# Patient Record
Sex: FEMALE | Race: WHITE | NOT HISPANIC OR LATINO | Employment: OTHER | URBAN - METROPOLITAN AREA
[De-identification: names, ages, dates, MRNs, and addresses within clinical notes are randomized per-mention and may not be internally consistent; named-entity substitution may affect disease eponyms.]

---

## 2017-12-11 ENCOUNTER — ANESTHESIA EVENT (OUTPATIENT)
Dept: SURGERY | Facility: HOSPITAL | Age: 62
DRG: 481 | End: 2017-12-11
Payer: MEDICARE

## 2017-12-11 ENCOUNTER — HOSPITAL ENCOUNTER (INPATIENT)
Facility: HOSPITAL | Age: 62
LOS: 5 days | Discharge: HOSPICE | DRG: 481 | End: 2017-12-16
Attending: EMERGENCY MEDICINE | Admitting: INTERNAL MEDICINE
Payer: MEDICARE

## 2017-12-11 DIAGNOSIS — S79.919A HIP INJURY: ICD-10-CM

## 2017-12-11 DIAGNOSIS — S72.001A CLOSED FRACTURE OF RIGHT HIP, INITIAL ENCOUNTER: Primary | ICD-10-CM

## 2017-12-11 DIAGNOSIS — S29.9XXA CHEST WALL INJURY: ICD-10-CM

## 2017-12-11 DIAGNOSIS — R00.0 TACHYCARDIA: ICD-10-CM

## 2017-12-11 LAB
ABO + RH BLD: NORMAL
ALBUMIN SERPL BCP-MCNC: 3.3 G/DL
ALP SERPL-CCNC: 49 U/L
ALT SERPL W/O P-5'-P-CCNC: 10 U/L
ANION GAP SERPL CALC-SCNC: 10 MMOL/L
APTT BLDCRRT: 21.7 SEC
AST SERPL-CCNC: 16 U/L
BASOPHILS # BLD AUTO: 0.03 K/UL
BASOPHILS NFR BLD: 0.2 %
BILIRUB SERPL-MCNC: 0.2 MG/DL
BLD GP AB SCN CELLS X3 SERPL QL: NORMAL
BUN SERPL-MCNC: 7 MG/DL
CALCIUM SERPL-MCNC: 8.4 MG/DL
CHLORIDE SERPL-SCNC: 102 MMOL/L
CO2 SERPL-SCNC: 30 MMOL/L
CREAT SERPL-MCNC: 0.6 MG/DL
DIFFERENTIAL METHOD: ABNORMAL
EOSINOPHIL # BLD AUTO: 0 K/UL
EOSINOPHIL NFR BLD: 0.2 %
ERYTHROCYTE [DISTWIDTH] IN BLOOD BY AUTOMATED COUNT: 13.4 %
EST. GFR  (AFRICAN AMERICAN): >60 ML/MIN/1.73 M^2
EST. GFR  (NON AFRICAN AMERICAN): >60 ML/MIN/1.73 M^2
GLUCOSE SERPL-MCNC: 85 MG/DL
HCT VFR BLD AUTO: 31.9 %
HGB BLD-MCNC: 10.6 G/DL
INR PPP: 1
LYMPHOCYTES # BLD AUTO: 1.9 K/UL
LYMPHOCYTES NFR BLD: 12.4 %
MCH RBC QN AUTO: 29.9 PG
MCHC RBC AUTO-ENTMCNC: 33.2 G/DL
MCV RBC AUTO: 90 FL
MONOCYTES # BLD AUTO: 1 K/UL
MONOCYTES NFR BLD: 6.4 %
NEUTROPHILS # BLD AUTO: 12.4 K/UL
NEUTROPHILS NFR BLD: 80.8 %
PLATELET # BLD AUTO: 287 K/UL
PMV BLD AUTO: 10.6 FL
POTASSIUM SERPL-SCNC: 3.4 MMOL/L
PROT SERPL-MCNC: 6 G/DL
PROTHROMBIN TIME: 10.8 SEC
RBC # BLD AUTO: 3.55 M/UL
SODIUM SERPL-SCNC: 142 MMOL/L
WBC # BLD AUTO: 15.36 K/UL

## 2017-12-11 PROCEDURE — 25000003 PHARM REV CODE 250: Performed by: EMERGENCY MEDICINE

## 2017-12-11 PROCEDURE — 99285 EMERGENCY DEPT VISIT HI MDM: CPT | Mod: 25

## 2017-12-11 PROCEDURE — 86901 BLOOD TYPING SEROLOGIC RH(D): CPT

## 2017-12-11 PROCEDURE — 97530 THERAPEUTIC ACTIVITIES: CPT

## 2017-12-11 PROCEDURE — 96375 TX/PRO/DX INJ NEW DRUG ADDON: CPT

## 2017-12-11 PROCEDURE — 63600175 PHARM REV CODE 636 W HCPCS: Performed by: EMERGENCY MEDICINE

## 2017-12-11 PROCEDURE — 80053 COMPREHEN METABOLIC PANEL: CPT

## 2017-12-11 PROCEDURE — 85730 THROMBOPLASTIN TIME PARTIAL: CPT

## 2017-12-11 PROCEDURE — 86900 BLOOD TYPING SEROLOGIC ABO: CPT

## 2017-12-11 PROCEDURE — 11000001 HC ACUTE MED/SURG PRIVATE ROOM

## 2017-12-11 PROCEDURE — 96374 THER/PROPH/DIAG INJ IV PUSH: CPT

## 2017-12-11 PROCEDURE — 96372 THER/PROPH/DIAG INJ SC/IM: CPT | Mod: XU

## 2017-12-11 PROCEDURE — 85610 PROTHROMBIN TIME: CPT

## 2017-12-11 PROCEDURE — 85025 COMPLETE CBC W/AUTO DIFF WBC: CPT

## 2017-12-11 RX ORDER — OXYBUTYNIN CHLORIDE 5 MG/1
5 TABLET ORAL 3 TIMES DAILY
COMMUNITY

## 2017-12-11 RX ORDER — SODIUM CHLORIDE, SODIUM LACTATE, POTASSIUM CHLORIDE, CALCIUM CHLORIDE 600; 310; 30; 20 MG/100ML; MG/100ML; MG/100ML; MG/100ML
INJECTION, SOLUTION INTRAVENOUS CONTINUOUS
Status: DISCONTINUED | OUTPATIENT
Start: 2017-12-11 | End: 2017-12-12

## 2017-12-11 RX ORDER — TRAZODONE HYDROCHLORIDE 50 MG/1
400 TABLET ORAL NIGHTLY
Status: DISCONTINUED | OUTPATIENT
Start: 2017-12-11 | End: 2017-12-16 | Stop reason: HOSPADM

## 2017-12-11 RX ORDER — SODIUM CHLORIDE, SODIUM LACTATE, POTASSIUM CHLORIDE, CALCIUM CHLORIDE 600; 310; 30; 20 MG/100ML; MG/100ML; MG/100ML; MG/100ML
1000 INJECTION, SOLUTION INTRAVENOUS CONTINUOUS
Status: DISCONTINUED | OUTPATIENT
Start: 2017-12-11 | End: 2017-12-11

## 2017-12-11 RX ORDER — MAG HYDROX/ALUMINUM HYD/SIMETH 200-200-20
30 SUSPENSION, ORAL (FINAL DOSE FORM) ORAL
Status: ON HOLD | COMMUNITY
End: 2017-12-16

## 2017-12-11 RX ORDER — MAG HYDROX/ALUMINUM HYD/SIMETH 200-200-20
30 SUSPENSION, ORAL (FINAL DOSE FORM) ORAL
Status: DISCONTINUED | OUTPATIENT
Start: 2017-12-11 | End: 2017-12-16

## 2017-12-11 RX ORDER — MORPHINE SULFATE 8 MG/ML
5 INJECTION INTRAMUSCULAR; INTRAVENOUS; SUBCUTANEOUS
Status: COMPLETED | OUTPATIENT
Start: 2017-12-11 | End: 2017-12-11

## 2017-12-11 RX ORDER — CYCLOBENZAPRINE HCL 10 MG
10 TABLET ORAL 3 TIMES DAILY PRN
COMMUNITY

## 2017-12-11 RX ORDER — FLUOXETINE HYDROCHLORIDE 20 MG/1
20 CAPSULE ORAL DAILY
Status: DISCONTINUED | OUTPATIENT
Start: 2017-12-12 | End: 2017-12-16 | Stop reason: HOSPADM

## 2017-12-11 RX ORDER — MORPHINE SULFATE 8 MG/ML
4 INJECTION INTRAMUSCULAR; INTRAVENOUS; SUBCUTANEOUS EVERY 4 HOURS PRN
Status: DISCONTINUED | OUTPATIENT
Start: 2017-12-12 | End: 2017-12-12

## 2017-12-11 RX ORDER — OMEPRAZOLE 20 MG/1
20 CAPSULE, DELAYED RELEASE ORAL DAILY
COMMUNITY

## 2017-12-11 RX ORDER — PROMETHAZINE HYDROCHLORIDE 25 MG/1
25 TABLET ORAL EVERY 4 HOURS
COMMUNITY

## 2017-12-11 RX ORDER — AMOXICILLIN 250 MG
1 CAPSULE ORAL DAILY
Status: DISCONTINUED | OUTPATIENT
Start: 2017-12-12 | End: 2017-12-16 | Stop reason: HOSPADM

## 2017-12-11 RX ORDER — HEPARIN SODIUM 5000 [USP'U]/ML
5000 INJECTION, SOLUTION INTRAVENOUS; SUBCUTANEOUS
Status: COMPLETED | OUTPATIENT
Start: 2017-12-11 | End: 2017-12-11

## 2017-12-11 RX ORDER — ACETAMINOPHEN 325 MG/1
650 TABLET ORAL EVERY 6 HOURS PRN
Status: DISCONTINUED | OUTPATIENT
Start: 2017-12-11 | End: 2017-12-12

## 2017-12-11 RX ORDER — CYCLOBENZAPRINE HCL 10 MG
10 TABLET ORAL 3 TIMES DAILY PRN
Status: DISCONTINUED | OUTPATIENT
Start: 2017-12-11 | End: 2017-12-12

## 2017-12-11 RX ORDER — OXYCODONE HCL 10 MG/1
30 TABLET, FILM COATED, EXTENDED RELEASE ORAL EVERY 12 HOURS
Status: DISCONTINUED | OUTPATIENT
Start: 2017-12-11 | End: 2017-12-16 | Stop reason: HOSPADM

## 2017-12-11 RX ORDER — POLYETHYLENE GLYCOL 3350 17 G/17G
POWDER, FOR SOLUTION ORAL
COMMUNITY

## 2017-12-11 RX ORDER — LIDOCAINE HYDROCHLORIDE 10 MG/ML
1 INJECTION, SOLUTION EPIDURAL; INFILTRATION; INTRACAUDAL; PERINEURAL ONCE
Status: DISCONTINUED | OUTPATIENT
Start: 2017-12-11 | End: 2017-12-12

## 2017-12-11 RX ORDER — FENTANYL CITRATE 50 UG/ML
50 INJECTION, SOLUTION INTRAMUSCULAR; INTRAVENOUS
Status: COMPLETED | OUTPATIENT
Start: 2017-12-11 | End: 2017-12-11

## 2017-12-11 RX ORDER — OXYBUTYNIN CHLORIDE 5 MG/1
5 TABLET ORAL 3 TIMES DAILY
Status: DISCONTINUED | OUTPATIENT
Start: 2017-12-11 | End: 2017-12-16 | Stop reason: HOSPADM

## 2017-12-11 RX ORDER — TEMAZEPAM 30 MG/1
30 CAPSULE ORAL NIGHTLY PRN
COMMUNITY

## 2017-12-11 RX ORDER — AMLODIPINE BESYLATE 5 MG/1
10 TABLET ORAL DAILY
Status: DISCONTINUED | OUTPATIENT
Start: 2017-12-12 | End: 2017-12-13

## 2017-12-11 RX ORDER — PANTOPRAZOLE SODIUM 40 MG/1
40 TABLET, DELAYED RELEASE ORAL DAILY
Status: DISCONTINUED | OUTPATIENT
Start: 2017-12-12 | End: 2017-12-16 | Stop reason: HOSPADM

## 2017-12-11 RX ORDER — IPRATROPIUM BROMIDE AND ALBUTEROL SULFATE 2.5; .5 MG/3ML; MG/3ML
3 SOLUTION RESPIRATORY (INHALATION) EVERY 6 HOURS PRN
Status: DISCONTINUED | OUTPATIENT
Start: 2017-12-11 | End: 2017-12-16 | Stop reason: HOSPADM

## 2017-12-11 RX ORDER — FLUTICASONE FUROATE AND VILANTEROL 200; 25 UG/1; UG/1
1 POWDER RESPIRATORY (INHALATION) DAILY
Status: DISCONTINUED | OUTPATIENT
Start: 2017-12-11 | End: 2017-12-16 | Stop reason: HOSPADM

## 2017-12-11 RX ORDER — POLYETHYLENE GLYCOL 3350 17 G/17G
17 POWDER, FOR SOLUTION ORAL DAILY
Status: DISCONTINUED | OUTPATIENT
Start: 2017-12-12 | End: 2017-12-16 | Stop reason: HOSPADM

## 2017-12-11 RX ORDER — BENZONATATE 100 MG/1
100 CAPSULE ORAL 3 TIMES DAILY PRN
Status: DISCONTINUED | OUTPATIENT
Start: 2017-12-11 | End: 2017-12-16 | Stop reason: HOSPADM

## 2017-12-11 RX ORDER — ALBUTEROL SULFATE 90 UG/1
2 AEROSOL, METERED RESPIRATORY (INHALATION) EVERY 6 HOURS PRN
Status: DISCONTINUED | OUTPATIENT
Start: 2017-12-11 | End: 2017-12-16 | Stop reason: HOSPADM

## 2017-12-11 RX ORDER — MELOXICAM 15 MG/1
15 TABLET ORAL DAILY
COMMUNITY

## 2017-12-11 RX ORDER — SODIUM CHLORIDE, SODIUM LACTATE, POTASSIUM CHLORIDE, CALCIUM CHLORIDE 600; 310; 30; 20 MG/100ML; MG/100ML; MG/100ML; MG/100ML
1000 INJECTION, SOLUTION INTRAVENOUS CONTINUOUS
Status: ACTIVE | OUTPATIENT
Start: 2017-12-11 | End: 2017-12-12

## 2017-12-11 RX ORDER — AMOXICILLIN 250 MG
1 CAPSULE ORAL 2 TIMES DAILY
Status: DISCONTINUED | OUTPATIENT
Start: 2017-12-11 | End: 2017-12-14

## 2017-12-11 RX ORDER — OXYCODONE HYDROCHLORIDE 30 MG/1
30 TABLET, FILM COATED, EXTENDED RELEASE ORAL EVERY 12 HOURS
COMMUNITY

## 2017-12-11 RX ORDER — GUAIFENESIN 600 MG/1
1200 TABLET, EXTENDED RELEASE ORAL 2 TIMES DAILY
Status: DISCONTINUED | OUTPATIENT
Start: 2017-12-11 | End: 2017-12-16 | Stop reason: HOSPADM

## 2017-12-11 RX ORDER — GUAIFENESIN 600 MG/1
1200 TABLET, EXTENDED RELEASE ORAL 2 TIMES DAILY
COMMUNITY

## 2017-12-11 RX ORDER — AMOXICILLIN 250 MG
1 CAPSULE ORAL DAILY
COMMUNITY

## 2017-12-11 RX ORDER — BENZONATATE 100 MG/1
100 CAPSULE ORAL 3 TIMES DAILY PRN
COMMUNITY

## 2017-12-11 RX ORDER — LORAZEPAM 0.5 MG/1
1 TABLET ORAL
Status: COMPLETED | OUTPATIENT
Start: 2017-12-11 | End: 2017-12-11

## 2017-12-11 RX ORDER — LORAZEPAM 0.5 MG/1
1 TABLET ORAL EVERY 8 HOURS PRN
Status: DISCONTINUED | OUTPATIENT
Start: 2017-12-11 | End: 2017-12-16 | Stop reason: HOSPADM

## 2017-12-11 RX ORDER — FENTANYL 25 UG/1
1 PATCH TRANSDERMAL
COMMUNITY

## 2017-12-11 RX ADMIN — LORAZEPAM 1 MG: 0.5 TABLET ORAL at 12:12

## 2017-12-11 RX ADMIN — CYCLOBENZAPRINE HYDROCHLORIDE 10 MG: 10 TABLET, FILM COATED ORAL at 09:12

## 2017-12-11 RX ADMIN — SODIUM CHLORIDE, SODIUM LACTATE, POTASSIUM CHLORIDE, AND CALCIUM CHLORIDE 1000 ML: .6; .31; .03; .02 INJECTION, SOLUTION INTRAVENOUS at 04:12

## 2017-12-11 RX ADMIN — OXYCODONE HYDROCHLORIDE 30 MG: 10 TABLET, FILM COATED, EXTENDED RELEASE ORAL at 09:12

## 2017-12-11 RX ADMIN — MORPHINE SULFATE 5.04 MG: 8 INJECTION, SOLUTION INTRAMUSCULAR; INTRAVENOUS at 04:12

## 2017-12-11 RX ADMIN — ALUMINUM HYDROXIDE, MAGNESIUM HYDROXIDE, AND SIMETHICONE 30 ML: 200; 200; 20 SUSPENSION ORAL at 04:12

## 2017-12-11 RX ADMIN — ALUMINUM HYDROXIDE, MAGNESIUM HYDROXIDE, AND SIMETHICONE 30 ML: 200; 200; 20 SUSPENSION ORAL at 09:12

## 2017-12-11 RX ADMIN — GUAIFENESIN 1200 MG: 600 TABLET, EXTENDED RELEASE ORAL at 09:12

## 2017-12-11 RX ADMIN — HEPARIN SODIUM 5000 UNITS: 5000 INJECTION, SOLUTION INTRAVENOUS; SUBCUTANEOUS at 02:12

## 2017-12-11 RX ADMIN — DOCUSATE SODIUM AND SENNOSIDES 1 TABLET: 8.6; 5 TABLET, FILM COATED ORAL at 09:12

## 2017-12-11 RX ADMIN — LORAZEPAM 1 MG: 0.5 TABLET ORAL at 04:12

## 2017-12-11 RX ADMIN — TRAZODONE HYDROCHLORIDE 400 MG: 50 TABLET ORAL at 09:12

## 2017-12-11 RX ADMIN — FENTANYL CITRATE 50 MCG: 50 INJECTION, SOLUTION INTRAMUSCULAR; INTRAVENOUS at 11:12

## 2017-12-11 RX ADMIN — ARIPIPRAZOLE 15 MG: 5 TABLET ORAL at 09:12

## 2017-12-11 RX ADMIN — OXYBUTYNIN CHLORIDE 5 MG: 5 TABLET ORAL at 09:12

## 2017-12-11 NOTE — SUBJECTIVE & OBJECTIVE
Past Medical History:   Diagnosis Date    Anxiety reaction     Back pain     neck injury--disc problems    Bipolar disorder     COPD (chronic obstructive pulmonary disease)     DJD (degenerative joint disease)     Essential hypertension 2013    GERD (gastroesophageal reflux disease)     Mild protein malnutrition 6/10/2015    Osteoporosis, unspecified     Tobacco abuse     Weight loss        Past Surgical History:   Procedure Laterality Date     SECTION, CLASSIC      x 1    HYSTERECTOMY         Review of patient's allergies indicates:   Allergen Reactions    Avelox [moxifloxacin] Rash       No current facility-administered medications on file prior to encounter.      Current Outpatient Prescriptions on File Prior to Encounter   Medication Sig    albuterol 90 mcg/actuation inhaler Inhale 2 puffs into the lungs every 6 (six) hours as needed for Wheezing.    albuterol-ipratropium 2.5mg-0.5mg/3mL (DUO-NEB) 0.5 mg-3 mg(2.5 mg base)/3 mL nebulizer solution Take 3 mLs by nebulization every 6 (six) hours as needed for Wheezing.    amlodipine (NORVASC) 10 MG tablet Take 1 tablet (10 mg total) by mouth once daily.    aripiprazole (ABILIFY) 30 MG Tab Take 15 mg by mouth nightly.     fluoxetine (PROZAC) 40 MG capsule Take 20 mg by mouth once daily.     fluticasone-salmeterol 500-50 mcg/dose (ADVAIR DISKUS) 500-50 mcg/dose DsDv diskus inhaler Inhale 1 puff into the lungs 2 (two) times daily. Rinse mouth after each use.    lorazepam (ATIVAN) 1 MG tablet Take 1 mg by mouth 2 (two) times daily as needed.    OXYGEN-AIR DELIVERY SYSTEMS MISC by [facility].(Non-Drug; Combo Route) route. Uses O2 via N/C  At 2 liters at home when neededd    predniSONE (DELTASONE) 20 MG tablet Prednisone 60 mg X 5 days; 50 mg X 1 day; 40 mg X 1 Day; 30 mg X 1 Day; 20 Mg X 1 Day then STOP!!    tiotropium (SPIRIVA WITH HANDIHALER) 18 mcg inhalation capsule Inhale 18 mcg into the lungs once daily.    trazodone (DESYREL) 300 MG  tablet Take 400 mg by mouth every evening.    [DISCONTINUED] aspirin 81 MG Chew Take 81 mg by mouth once daily.    [DISCONTINUED] gabapentin (NEURONTIN) 300 MG capsule Take 300 mg by mouth 3 (three) times daily.    [DISCONTINUED] lamotrigine (LAMICTAL) 200 MG tablet Take 200 mg by mouth once daily.    [DISCONTINUED] NEXIUM 40 mg capsule take 1 capsule by mouth once daily    [DISCONTINUED] oxycodone-acetaminophen (PERCOCET) 7.5-325 mg per tablet Take 1 tablet by mouth every 4 (four) hours as needed for Pain.     Family History     Problem Relation (Age of Onset)    Asthma Mother, Sister    Cancer Father    Heart attack Father    Hypertension Mother, Father    Stroke Mother, Father        Social History Main Topics    Smoking status: Current Every Day Smoker     Packs/day: 0.75     Years: 40.00     Types: Cigarettes    Smokeless tobacco: Never Used    Alcohol use Yes      Comment: OCCASIONALLY    Drug use: No    Sexual activity: Not Currently     Review of Systems   Constitutional: Negative for activity change, appetite change, chills, diaphoresis and fever.   Eyes: Negative for discharge.   Respiratory: Negative for apnea, cough, choking, chest tightness, shortness of breath, wheezing and stridor.    Cardiovascular: Negative for chest pain, palpitations and leg swelling.   Gastrointestinal: Negative for abdominal pain, constipation, diarrhea, nausea and vomiting.   Genitourinary: Negative for difficulty urinating.   Musculoskeletal: Positive for arthralgias.   Psychiatric/Behavioral: Negative for agitation and behavioral problems.     Objective:     Vital Signs (Most Recent):  Temp: 99.9 °F (37.7 °C) (12/11/17 1243)  Pulse: 74 (12/11/17 1243)  Resp: 20 (12/11/17 1243)  BP: 101/66 (12/11/17 1243)  SpO2: 100 % (12/11/17 1243) Vital Signs (24h Range):  Temp:  [98.4 °F (36.9 °C)-99.9 °F (37.7 °C)] 99.9 °F (37.7 °C)  Pulse:  [74-84] 74  Resp:  [20] 20  SpO2:  [100 %] 100 %  BP: (101-126)/(66-72) 101/66      Weight: 44.9 kg (98 lb 14.4 oz)  Body mass index is 19.32 kg/m².    Physical Exam   Constitutional: She appears well-developed and well-nourished.   HENT:   Head: Normocephalic and atraumatic.   Cardiovascular: Normal rate.  Exam reveals no friction rub.    Pulmonary/Chest: Effort normal and breath sounds normal. No respiratory distress. She has no wheezes. She has no rales. She exhibits no tenderness.   Abdominal: Soft. Bowel sounds are normal. There is no tenderness. There is no rebound and no guarding.   Neurological: She is alert.   Skin: Skin is warm and dry.   Psychiatric: She has a normal mood and affect. Her behavior is normal.   Vitals reviewed.          Significant Labs:   BMP:   Recent Labs  Lab 12/11/17  1128   GLU 85      K 3.4*      CO2 30*   BUN 7*   CREATININE 0.6   CALCIUM 8.4*     CBC:   Recent Labs  Lab 12/11/17  1128   WBC 15.36*   HGB 10.6*   HCT 31.9*          Significant Imaging CXR- negative.   X-ray R hip- fracture.   X-ray R ribs- no fracture.

## 2017-12-11 NOTE — HPI
Mrs. Owen is a 61 yo F who presents to the ED with a CC or R hip pain. The patient notes that her daughter was having a seizure- she tried catching her and landed on her R hip. She presents to the ED and is found to have a R hip fracture. The patient has a known history of COPD and is on home 02.  She does not have any other complaints. No worsening SOB. No CP.  Ortho has been consulted and may take the patient to surgery later.

## 2017-12-11 NOTE — H&P
[facility] Medicine  History & Physical    Patient Name: Rebecca Owen  MRN: [id number]  Admission Date: 2017  Attending Physician: Ham Matias Jr., *   Primary Care Provider: Asa Weinstein MD         Patient information was obtained from patient and ER records.     Subjective:     Principal Problem:Closed fracture of right hip    Chief Complaint:   Chief Complaint   Patient presents with    Fall     Pt's daughter accidently ran into pt, pt fell down, + LOC few seconds, Now AAOx4. Pt was on hospice until transfer today. 50 mg of Fentanyl given by EMS. Pt wears Fentanyl patch.          HPI: Mrs. Owen is a 61 yo F who presents to the ED with a CC or R hip pain. The patient notes that her daughter was having a seizure- she tried catching her and landed on her R hip. She presents to the ED and is found to have a R hip fracture. The patient has a known history of COPD and is on home 02.  She does not have any other complaints. No worsening SOB. No CP.  Ortho has been consulted and may take the patient to surgery later.     Past Medical History:   Diagnosis Date    Anxiety reaction     Back pain     neck injury--disc problems    Bipolar disorder     COPD (chronic obstructive pulmonary disease)     DJD (degenerative joint disease)     Essential hypertension 2013    GERD (gastroesophageal reflux disease)     Mild protein malnutrition 6/10/2015    Osteoporosis, unspecified     Tobacco abuse     Weight loss        Past Surgical History:   Procedure Laterality Date     SECTION, CLASSIC      x 1    HYSTERECTOMY         Review of patient's allergies indicates:   Allergen Reactions    Avelox [moxifloxacin] Rash       No current facility-administered medications on file prior to encounter.      Current Outpatient Prescriptions on File Prior to Encounter   Medication Sig    albuterol 90 mcg/actuation inhaler Inhale 2 puffs into the lungs every 6  (six) hours as needed for Wheezing.    albuterol-ipratropium 2.5mg-0.5mg/3mL (DUO-NEB) 0.5 mg-3 mg(2.5 mg base)/3 mL nebulizer solution Take 3 mLs by nebulization every 6 (six) hours as needed for Wheezing.    amlodipine (NORVASC) 10 MG tablet Take 1 tablet (10 mg total) by mouth once daily.    aripiprazole (ABILIFY) 30 MG Tab Take 15 mg by mouth nightly.     fluoxetine (PROZAC) 40 MG capsule Take 20 mg by mouth once daily.     fluticasone-salmeterol 500-50 mcg/dose (ADVAIR DISKUS) 500-50 mcg/dose DsDv diskus inhaler Inhale 1 puff into the lungs 2 (two) times daily. Rinse mouth after each use.    lorazepam (ATIVAN) 1 MG tablet Take 1 mg by mouth 2 (two) times daily as needed.    OXYGEN-AIR DELIVERY SYSTEMS MISC by [facility].(Non-Drug; Combo Route) route. Uses O2 via N/C  At 2 liters at home when neededd    predniSONE (DELTASONE) 20 MG tablet Prednisone 60 mg X 5 days; 50 mg X 1 day; 40 mg X 1 Day; 30 mg X 1 Day; 20 Mg X 1 Day then STOP!!    tiotropium (SPIRIVA WITH HANDIHALER) 18 mcg inhalation capsule Inhale 18 mcg into the lungs once daily.    trazodone (DESYREL) 300 MG tablet Take 400 mg by mouth every evening.    [DISCONTINUED] aspirin 81 MG Chew Take 81 mg by mouth once daily.    [DISCONTINUED] gabapentin (NEURONTIN) 300 MG capsule Take 300 mg by mouth 3 (three) times daily.    [DISCONTINUED] lamotrigine (LAMICTAL) 200 MG tablet Take 200 mg by mouth once daily.    [DISCONTINUED] NEXIUM 40 mg capsule take 1 capsule by mouth once daily    [DISCONTINUED] oxycodone-acetaminophen (PERCOCET) 7.5-325 mg per tablet Take 1 tablet by mouth every 4 (four) hours as needed for Pain.     Family History     Problem Relation (Age of Onset)    Asthma Mother, Sister    Cancer Father    Heart attack Father    Hypertension Mother, Father    Stroke Mother, Father        Social History Main Topics    Smoking status: Current Every Day Smoker     Packs/day: 0.75     Years: 40.00     Types: Cigarettes    Smokeless tobacco:  Never Used    Alcohol use Yes      Comment: OCCASIONALLY    Drug use: No    Sexual activity: Not Currently     Review of Systems   Constitutional: Negative for activity change, appetite change, chills, diaphoresis and fever.   Eyes: Negative for discharge.   Respiratory: Negative for apnea, cough, choking, chest tightness, shortness of breath, wheezing and stridor.    Cardiovascular: Negative for chest pain, palpitations and leg swelling.   Gastrointestinal: Negative for abdominal pain, constipation, diarrhea, nausea and vomiting.   Genitourinary: Negative for difficulty urinating.   Musculoskeletal: Positive for arthralgias.   Psychiatric/Behavioral: Negative for agitation and behavioral problems.     Objective:     Vital Signs (Most Recent):  Temp: 99.9 °F (37.7 °C) (12/11/17 1243)  Pulse: 74 (12/11/17 1243)  Resp: 20 (12/11/17 1243)  BP: 101/66 (12/11/17 1243)  SpO2: 100 % (12/11/17 1243) Vital Signs (24h Range):  Temp:  [98.4 °F (36.9 °C)-99.9 °F (37.7 °C)] 99.9 °F (37.7 °C)  Pulse:  [74-84] 74  Resp:  [20] 20  SpO2:  [100 %] 100 %  BP: (101-126)/(66-72) 101/66     Weight: 44.9 kg (98 lb 14.4 oz)  Body mass index is 19.32 kg/m².    Physical Exam   Constitutional: She appears well-developed and well-nourished.   HENT:   Head: Normocephalic and atraumatic.   Cardiovascular: Normal rate.  Exam reveals no friction rub.    Pulmonary/Chest: Effort normal and breath sounds normal. No respiratory distress. She has no wheezes. She has no rales. She exhibits no tenderness.   Abdominal: Soft. Bowel sounds are normal. There is no tenderness. There is no rebound and no guarding.   Neurological: She is alert.   Skin: Skin is warm and dry.   Psychiatric: She has a normal mood and affect. Her behavior is normal.   Vitals reviewed.          Significant Labs:   BMP:   Recent Labs  Lab 12/11/17  1128   GLU 85      K 3.4*      CO2 30*   BUN 7*   CREATININE 0.6   CALCIUM 8.4*     CBC:   Recent Labs  Lab 12/11/17  1128    WBC 15.36*   HGB 10.6*   HCT 31.9*          Significant Imaging CXR- negative.   X-ray R hip- fracture.   X-ray R ribs- no fracture.     Assessment/Plan:     * Closed fracture of right hip    Ortho consulted.  Ok to proceed with surgery. Increase risk secondary to COPD.  No acute cardiac or pulmonary decompensation .          Chronic respiratory failure with hypoxia    On Home 02. Still smokes.           DJD (degenerative joint disease)    No acute issues.           Anxiety    Resume home meds.           Borderline hypertension    Well controlled on BP meds.           Hypokalemia    Minimal. Will follow           Mild protein malnutrition    Will discuss with patient .          Bipolar 1 disorder    Resume home meds.          Tobacco abuse    Continues to smoke. Discussed with patient           GERD (gastroesophageal reflux disease)    Resume home meds.             VTE Risk Mitigation         Ordered     heparin (porcine) injection 5,000 Units  ED 1 Time     Route:  Subcutaneous        12/11/17 1259        Ok to proceed with surgery. Increase risk secondary to COPD.  No acute cardiac or pulmonary decompensation .        Coliln Jain MD  Department of Hospital Medicine   [facility]

## 2017-12-11 NOTE — ED TRIAGE NOTES
Pt comes from her home via ems. States her daughter was having a seizure and she went to catch her, they both fell back into a marble table. Pt hit the back of her head, no lac. She woke up on the ground. Pt states she has pain to the right flank and right hip. Laying in position of comfort on the left side, no shortening noted. No numbness to right foot.

## 2017-12-11 NOTE — NURSING
Pt transferred to floor via stretcher. Pt in no distress. Complaints of pain to R hip. Oriented to floor and room. Will cont to monitor.

## 2017-12-11 NOTE — PROGRESS NOTES
Physical Therapy   [facility]     Rebecca Owen   MRN: [id number]       [id number]    Nurse Maria A called PT office to inform [facility] traction order for 10lbs to right hip due to hip fracture. Patient with 7/10 pain to right hip and was pre-medicated prior to traction placement. PT wrapped right LE in cast padding per protocol. Traction boot placed to R LE and 10lb weight applied. Patient verbalized minimal relief in pain. Nurse Saha present throughout and educated in safety while traction in use. PT to follow up with patient as needed.     Nissa Gregory, PT

## 2017-12-11 NOTE — ED PROVIDER NOTES
Encounter Date: 2017    SCRIBE #1 NOTE: I, Ileana Lopez, am scribing for, and in the presence of,  Ham Matias MD. I have scribed the following portions of the note - Other sections scribed: ROS and HPI.       History     Chief Complaint   Patient presents with    Fall     Pt's daughter accidently ran into pt, pt fell down, + LOC few seconds, Now AAOx4. Pt was on hospice until transfer today. 50 mg of Fentanyl given by EMS. Pt wears Fentanyl patch.       CC: Fall  HPI: This 62 y.o. female with  a past medical history of Anxiety reaction; Back pain; Bipolar disorder; COPD; DJD; Essential hypertension; GERD; Osteoporosis, unspecified; Tobacco abuse, presents to the ED via EMS complaining of a fall that occurred today just PTA. Patient states her daughter fell on her having seizure and pt fell on a marble table. She reports hitting her head and LOC for few seconds. Patient is AAOx4. She notes she fell on her right chest and right hip. She is c/o severe and constant R lateral hip pain. She is unable to straight her R leg. She reports mild SOB. She denies CP, numbness/weakness, dizziness or any other associated sx. 50 MG Fentanyl patch was given by EMS.       The history is provided by the patient. No  was used.     Review of patient's allergies indicates:   Allergen Reactions    Avelox [moxifloxacin] Rash     Past Medical History:   Diagnosis Date    Anxiety reaction     Back pain     neck injury--disc problems    Bipolar disorder     COPD (chronic obstructive pulmonary disease)     DJD (degenerative joint disease)     Essential hypertension 2013    GERD (gastroesophageal reflux disease)     Mild protein malnutrition 6/10/2015    Osteoporosis, unspecified     Tobacco abuse     Weight loss      Past Surgical History:   Procedure Laterality Date     SECTION, CLASSIC      x 1    HYSTERECTOMY       Family History   Problem Relation Age of Onset    Hypertension  Mother     Stroke Mother     Asthma Mother     Hypertension Father     Stroke Father     Heart attack Father     Cancer Father      lung    Asthma Sister      Social History   Substance Use Topics    Smoking status: Current Every Day Smoker     Packs/day: 0.75     Years: 40.00     Types: Cigarettes    Smokeless tobacco: Never Used    Alcohol use Yes      Comment: OCCASIONALLY     Review of Systems   Constitutional: Negative for fever.   HENT: Negative for sore throat.    Respiratory: Negative for shortness of breath.    Cardiovascular: Positive for chest pain (right chest wall).   Gastrointestinal: Negative for nausea.   Genitourinary: Negative for dysuria.   Musculoskeletal: Positive for arthralgias (R lateral hip pain). Negative for back pain.   Skin: Negative for rash.   Neurological: Positive for syncope. Negative for weakness.   Hematological: Does not bruise/bleed easily.       Physical Exam     Initial Vitals [12/11/17 1102]   BP Pulse Resp Temp SpO2   126/72 84 20 98.4 °F (36.9 °C) 100 %      MAP       90         Physical Exam    Nursing note and vitals reviewed.  Constitutional: She is not diaphoretic. No distress.   Patient is chronically ill-appearing.  She is cachectic.   HENT:   Head: Normocephalic and atraumatic.   Right Ear: External ear normal.   Left Ear: External ear normal.   Nose: Nose normal.   Mouth/Throat: Oropharynx is clear and moist.   Eyes: Conjunctivae and EOM are normal. Pupils are equal, round, and reactive to light. Right eye exhibits no discharge. Left eye exhibits no discharge. No scleral icterus.   Neck: Normal range of motion. Neck supple.   Cardiovascular: Normal rate, regular rhythm, normal heart sounds and intact distal pulses.   No murmur heard.  Pulmonary/Chest: No respiratory distress. She has no wheezes. She has no rhonchi. She has no rales. She exhibits tenderness.   Patient has decreased breath sounds diffusely bilaterally.  Tenderness without crepitus,  deformity, flail chest over the right mid lateral hemithorax.   Abdominal: Soft. Bowel sounds are normal. She exhibits no distension and no mass. There is no tenderness. There is no rebound and no guarding.   Musculoskeletal: She exhibits tenderness. She exhibits no edema.   Significant tenderness with deformity noted over the left hip.  Skin appears normal.  DP and PT pulses are equal and intact in bilateral lower extremities.  Sensation and motor equal and intact in bilateral lower extremities.   Neurological: She is alert and oriented to person, place, and time. She has normal strength. No cranial nerve deficit or sensory deficit.   Skin: Skin is warm and dry. No rash noted. No erythema. No pallor.   Psychiatric: She has a normal mood and affect. Her behavior is normal. Judgment and thought content normal.         ED Course   Procedures  Labs Reviewed   CBC W/ AUTO DIFFERENTIAL - Abnormal; Notable for the following:        Result Value    WBC 15.36 (*)     RBC 3.55 (*)     Hemoglobin 10.6 (*)     Hematocrit 31.9 (*)     Gran # 12.4 (*)     Gran% 80.8 (*)     Lymph% 12.4 (*)     All other components within normal limits   COMPREHENSIVE METABOLIC PANEL - Abnormal; Notable for the following:     Potassium 3.4 (*)     CO2 30 (*)     BUN, Bld 7 (*)     Calcium 8.4 (*)     Albumin 3.3 (*)     Alkaline Phosphatase 49 (*)     All other components within normal limits   APTT   PROTIME-INR   TYPE & SCREEN          X-Rays:   Independently Interpreted Readings:   Other Readings:  Rib x-ray reveals no evidence of rib fracture.  Chest x-ray reviewed and interpreted by me shows no evidence of pulmonary edema, pneumothorax, or consolidations/ infiltrates.    Hip x-ray reveals acute right intratrochanteric hip fracture.    Chest x-ray reviewed and interpreted by me shows no evidence of pulmonary edema, pneumothorax, or consolidations/ infiltrates.    Medical Decision Making:   ED Management:  This is the emergent evaluation of a  62-year-old female presents the emergency department complaining of fall with associated right-sided chest wall pain, right hip pain, and possible head injury.  Patient is unsure whether she lost consciousness.Differential diagnosis at the time of initial evaluation included, but was not limited to: Intracranial hemorrhage, skull fracture, concussion, scalp contusion, rib fracture, rib contusion, pneumothorax, pulmonary contusion, hip fracture, hip contusion, hip dislocation.  Patient has an intratrochanteric right hip fracture.  Case was discussed with orthopedics, Dr. Art.  He advises [facility] and admission.  Patient will be admitted to internal medicine given chronic medical problems.  I discussed the case with the admitting hospitalist.            Scribe Attestation:   Scribe #1: I performed the above scribed service and the documentation accurately describes the services I performed. I attest to the accuracy of the note.    Attending Attestation:           Physician Attestation for Scribe:  Physician Attestation Statement for Scribe #1: I, Ham Matias MD, reviewed documentation, as scribed by Ileana Lopez in my presence, and it is both accurate and complete.                 ED Course      Clinical Impression:   The primary encounter diagnosis was Closed fracture of right hip, initial encounter. Diagnoses of Hip injury and Chest wall injury were also pertinent to this visit.                           Ham Matias Jr., MD  12/11/17 8468

## 2017-12-11 NOTE — HOSPITAL COURSE
Ms Owen presented with traumatic R hip fracture. Imaging revealed acute comminuted intratrochanteric fracture of right hip. Ortho consulted. Patient is DNR and currently under hospice care, however, patient is not actively dying and intervention for fracture would offer benefit, comfort and improved quality of life. Underwent IM nailing of right hip on 12/12. No complications or significant blood loss. Remained stable respiratory wise post surgery. Tolerated diet, DVT prophylaxis and physical therapy but with minimal activity as per patient's request and tolerance. Had one episode of hyperactive delirium overnight 12/13-12/14. This eventually resolved and was placed on CPAP for more oxygen support in the meantime. Also with blood loss post op, which is expected, especially with underlying anemia of chronic disease. One unit of blood transfused. Patient overall improved with this. After discussion with patient, patient's daughter and physical therapy, aggressive rehab services in either rehab facility or SNF were not pursued. Patient's underlying COPD is a limiting factor for aggressive therapy. Patient's preference is to proceed with minimal activity as tolerated. Conservative aspirin for DVT prophylaxis. F/u with ortho in 2 weeks. Regular diet.

## 2017-12-11 NOTE — ASSESSMENT & PLAN NOTE
Ortho consulted.  Ok to proceed with surgery. Increase risk secondary to COPD.  No acute cardiac or pulmonary decompensation .

## 2017-12-12 ENCOUNTER — ANESTHESIA (OUTPATIENT)
Dept: SURGERY | Facility: HOSPITAL | Age: 62
DRG: 481 | End: 2017-12-12
Payer: MEDICARE

## 2017-12-12 PROCEDURE — 27201423 OPTIME MED/SURG SUP & DEVICES STERILE SUPPLY: Performed by: ORTHOPAEDIC SURGERY

## 2017-12-12 PROCEDURE — 25000003 PHARM REV CODE 250: Performed by: NURSE ANESTHETIST, CERTIFIED REGISTERED

## 2017-12-12 PROCEDURE — 63600175 PHARM REV CODE 636 W HCPCS: Performed by: NURSE ANESTHETIST, CERTIFIED REGISTERED

## 2017-12-12 PROCEDURE — 94640 AIRWAY INHALATION TREATMENT: CPT

## 2017-12-12 PROCEDURE — S0020 INJECTION, BUPIVICAINE HYDRO: HCPCS | Performed by: ANESTHESIOLOGY

## 2017-12-12 PROCEDURE — 94761 N-INVAS EAR/PLS OXIMETRY MLT: CPT

## 2017-12-12 PROCEDURE — D9220A PRA ANESTHESIA: Mod: GW,ANES,, | Performed by: ANESTHESIOLOGY

## 2017-12-12 PROCEDURE — 25000003 PHARM REV CODE 250: Performed by: EMERGENCY MEDICINE

## 2017-12-12 PROCEDURE — 37000008 HC ANESTHESIA 1ST 15 MINUTES: Performed by: ORTHOPAEDIC SURGERY

## 2017-12-12 PROCEDURE — 27200688 HC TRAY, SPINAL-HYPER/ ISOBARIC: Performed by: NURSE ANESTHETIST, CERTIFIED REGISTERED

## 2017-12-12 PROCEDURE — 37000009 HC ANESTHESIA EA ADD 15 MINS: Performed by: ORTHOPAEDIC SURGERY

## 2017-12-12 PROCEDURE — 63600175 PHARM REV CODE 636 W HCPCS: Performed by: HOSPITALIST

## 2017-12-12 PROCEDURE — 63600175 PHARM REV CODE 636 W HCPCS: Performed by: ORTHOPAEDIC SURGERY

## 2017-12-12 PROCEDURE — 36000710: Performed by: ORTHOPAEDIC SURGERY

## 2017-12-12 PROCEDURE — 25000003 PHARM REV CODE 250: Performed by: ANESTHESIOLOGY

## 2017-12-12 PROCEDURE — 27000221 HC OXYGEN, UP TO 24 HOURS

## 2017-12-12 PROCEDURE — 71000033 HC RECOVERY, INTIAL HOUR: Performed by: ORTHOPAEDIC SURGERY

## 2017-12-12 PROCEDURE — 25000242 PHARM REV CODE 250 ALT 637 W/ HCPCS: Performed by: EMERGENCY MEDICINE

## 2017-12-12 PROCEDURE — 11000001 HC ACUTE MED/SURG PRIVATE ROOM

## 2017-12-12 PROCEDURE — 71000039 HC RECOVERY, EACH ADD'L HOUR: Performed by: ORTHOPAEDIC SURGERY

## 2017-12-12 PROCEDURE — 0QH636Z INSERTION OF INTRAMEDULLARY INTERNAL FIXATION DEVICE INTO RIGHT UPPER FEMUR, PERCUTANEOUS APPROACH: ICD-10-PCS | Performed by: ORTHOPAEDIC SURGERY

## 2017-12-12 PROCEDURE — D9220A PRA ANESTHESIA: Mod: GW,CRNA,, | Performed by: NURSE ANESTHETIST, CERTIFIED REGISTERED

## 2017-12-12 PROCEDURE — 36000711: Performed by: ORTHOPAEDIC SURGERY

## 2017-12-12 PROCEDURE — C1713 ANCHOR/SCREW BN/BN,TIS/BN: HCPCS | Performed by: ORTHOPAEDIC SURGERY

## 2017-12-12 PROCEDURE — 63600175 PHARM REV CODE 636 W HCPCS: Performed by: ANESTHESIOLOGY

## 2017-12-12 PROCEDURE — C1769 GUIDE WIRE: HCPCS | Performed by: ORTHOPAEDIC SURGERY

## 2017-12-12 DEVICE — NAIL IM CANN 130 DEG 11X340 R: Type: IMPLANTABLE DEVICE | Site: HIP | Status: FUNCTIONAL

## 2017-12-12 DEVICE — SCREW LOCKING 40MM: Type: IMPLANTABLE DEVICE | Site: HIP | Status: FUNCTIONAL

## 2017-12-12 RX ORDER — CEFAZOLIN SODIUM 1 G/50ML
1 SOLUTION INTRAVENOUS
Status: COMPLETED | OUTPATIENT
Start: 2017-12-12 | End: 2017-12-13

## 2017-12-12 RX ORDER — DEXAMETHASONE SODIUM PHOSPHATE 4 MG/ML
INJECTION, SOLUTION INTRA-ARTICULAR; INTRALESIONAL; INTRAMUSCULAR; INTRAVENOUS; SOFT TISSUE
Status: DISCONTINUED | OUTPATIENT
Start: 2017-12-12 | End: 2017-12-12

## 2017-12-12 RX ORDER — CEFAZOLIN SODIUM 2 G/50ML
2 SOLUTION INTRAVENOUS
Status: COMPLETED | OUTPATIENT
Start: 2017-12-12 | End: 2017-12-12

## 2017-12-12 RX ORDER — ACETAMINOPHEN 10 MG/ML
1000 INJECTION, SOLUTION INTRAVENOUS EVERY 8 HOURS
Status: COMPLETED | OUTPATIENT
Start: 2017-12-12 | End: 2017-12-13

## 2017-12-12 RX ORDER — MORPHINE SULFATE 8 MG/ML
4 INJECTION INTRAMUSCULAR; INTRAVENOUS; SUBCUTANEOUS EVERY 4 HOURS PRN
Status: DISCONTINUED | OUTPATIENT
Start: 2017-12-12 | End: 2017-12-14

## 2017-12-12 RX ORDER — BUPIVACAINE HYDROCHLORIDE 5 MG/ML
INJECTION, SOLUTION EPIDURAL; INTRACAUDAL
Status: DISCONTINUED | OUTPATIENT
Start: 2017-12-12 | End: 2017-12-12

## 2017-12-12 RX ORDER — PROPOFOL 10 MG/ML
VIAL (ML) INTRAVENOUS
Status: DISCONTINUED | OUTPATIENT
Start: 2017-12-12 | End: 2017-12-12

## 2017-12-12 RX ORDER — PHENYLEPHRINE HYDROCHLORIDE 10 MG/ML
INJECTION INTRAVENOUS
Status: DISCONTINUED | OUTPATIENT
Start: 2017-12-12 | End: 2017-12-12

## 2017-12-12 RX ORDER — ACETAMINOPHEN 10 MG/ML
1000 INJECTION, SOLUTION INTRAVENOUS EVERY 8 HOURS
Status: DISCONTINUED | OUTPATIENT
Start: 2017-12-12 | End: 2017-12-12

## 2017-12-12 RX ORDER — SODIUM CHLORIDE, SODIUM LACTATE, POTASSIUM CHLORIDE, CALCIUM CHLORIDE 600; 310; 30; 20 MG/100ML; MG/100ML; MG/100ML; MG/100ML
INJECTION, SOLUTION INTRAVENOUS CONTINUOUS PRN
Status: DISCONTINUED | OUTPATIENT
Start: 2017-12-12 | End: 2017-12-12

## 2017-12-12 RX ORDER — LIDOCAINE HCL/PF 100 MG/5ML
SYRINGE (ML) INTRAVENOUS
Status: DISCONTINUED | OUTPATIENT
Start: 2017-12-12 | End: 2017-12-12

## 2017-12-12 RX ORDER — OXYCODONE HYDROCHLORIDE 5 MG/1
5 TABLET ORAL EVERY 4 HOURS PRN
Status: DISCONTINUED | OUTPATIENT
Start: 2017-12-12 | End: 2017-12-16 | Stop reason: HOSPADM

## 2017-12-12 RX ORDER — ENOXAPARIN SODIUM 100 MG/ML
30 INJECTION SUBCUTANEOUS EVERY 24 HOURS
Status: DISCONTINUED | OUTPATIENT
Start: 2017-12-13 | End: 2017-12-14 | Stop reason: DRUGHIGH

## 2017-12-12 RX ORDER — MORPHINE SULFATE 8 MG/ML
2 INJECTION INTRAMUSCULAR; INTRAVENOUS; SUBCUTANEOUS EVERY 5 MIN PRN
Status: DISCONTINUED | OUTPATIENT
Start: 2017-12-12 | End: 2017-12-12

## 2017-12-12 RX ORDER — SODIUM CHLORIDE 0.9 % (FLUSH) 0.9 %
3 SYRINGE (ML) INJECTION
Status: DISCONTINUED | OUTPATIENT
Start: 2017-12-12 | End: 2017-12-12

## 2017-12-12 RX ADMIN — CEFAZOLIN SODIUM 1 G: 1 INJECTION, SOLUTION INTRAVENOUS at 10:12

## 2017-12-12 RX ADMIN — BUPIVACAINE HYDROCHLORIDE 2.75 ML: 5 INJECTION, SOLUTION EPIDURAL; INTRACAUDAL; PERINEURAL at 10:12

## 2017-12-12 RX ADMIN — LORAZEPAM 1 MG: 0.5 TABLET ORAL at 07:12

## 2017-12-12 RX ADMIN — ACETAMINOPHEN 1000 MG: 10 INJECTION, SOLUTION INTRAVENOUS at 09:12

## 2017-12-12 RX ADMIN — PHENYLEPHRINE HYDROCHLORIDE 200 MCG: 10 INJECTION INTRAVENOUS at 10:12

## 2017-12-12 RX ADMIN — SODIUM CHLORIDE, SODIUM LACTATE, POTASSIUM CHLORIDE, AND CALCIUM CHLORIDE: .6; .31; .03; .02 INJECTION, SOLUTION INTRAVENOUS at 10:12

## 2017-12-12 RX ADMIN — MORPHINE SULFATE 4 MG: 8 INJECTION, SOLUTION INTRAMUSCULAR; INTRAVENOUS at 08:12

## 2017-12-12 RX ADMIN — OXYCODONE HYDROCHLORIDE 30 MG: 10 TABLET, FILM COATED, EXTENDED RELEASE ORAL at 09:12

## 2017-12-12 RX ADMIN — PROPOFOL 10 MG: 10 INJECTION, EMULSION INTRAVENOUS at 10:12

## 2017-12-12 RX ADMIN — FLUTICASONE FUROATE AND VILANTEROL TRIFENATATE 1 PUFF: 200; 25 POWDER RESPIRATORY (INHALATION) at 08:12

## 2017-12-12 RX ADMIN — OXYCODONE HYDROCHLORIDE 30 MG: 10 TABLET, FILM COATED, EXTENDED RELEASE ORAL at 08:12

## 2017-12-12 RX ADMIN — OXYBUTYNIN CHLORIDE 5 MG: 5 TABLET ORAL at 09:12

## 2017-12-12 RX ADMIN — IPRATROPIUM BROMIDE AND ALBUTEROL SULFATE 3 ML: .5; 3 SOLUTION RESPIRATORY (INHALATION) at 07:12

## 2017-12-12 RX ADMIN — POLYETHYLENE GLYCOL (3350) 17 G: 17 POWDER, FOR SOLUTION ORAL at 08:12

## 2017-12-12 RX ADMIN — OXYBUTYNIN CHLORIDE 5 MG: 5 TABLET ORAL at 05:12

## 2017-12-12 RX ADMIN — MORPHINE SULFATE 4 MG: 8 INJECTION, SOLUTION INTRAMUSCULAR; INTRAVENOUS at 05:12

## 2017-12-12 RX ADMIN — IPRATROPIUM BROMIDE AND ALBUTEROL SULFATE 3 ML: .5; 3 SOLUTION RESPIRATORY (INHALATION) at 06:12

## 2017-12-12 RX ADMIN — PHENYLEPHRINE HYDROCHLORIDE 100 MCG: 10 INJECTION INTRAVENOUS at 10:12

## 2017-12-12 RX ADMIN — GUAIFENESIN 1200 MG: 600 TABLET, EXTENDED RELEASE ORAL at 09:12

## 2017-12-12 RX ADMIN — DOCUSATE SODIUM AND SENNOSIDES 1 TABLET: 8.6; 5 TABLET, FILM COATED ORAL at 08:12

## 2017-12-12 RX ADMIN — TRAZODONE HYDROCHLORIDE 400 MG: 50 TABLET ORAL at 09:12

## 2017-12-12 RX ADMIN — ARIPIPRAZOLE 15 MG: 5 TABLET ORAL at 09:12

## 2017-12-12 RX ADMIN — EPHEDRINE SULFATE 5 MG: 50 INJECTION, SOLUTION INTRAMUSCULAR; INTRAVENOUS; SUBCUTANEOUS at 11:12

## 2017-12-12 RX ADMIN — DEXAMETHASONE SODIUM PHOSPHATE 4 MG: 4 INJECTION, SOLUTION INTRAMUSCULAR; INTRAVENOUS at 11:12

## 2017-12-12 RX ADMIN — GUAIFENESIN 1200 MG: 600 TABLET, EXTENDED RELEASE ORAL at 08:12

## 2017-12-12 RX ADMIN — ALUMINUM HYDROXIDE, MAGNESIUM HYDROXIDE, AND SIMETHICONE 30 ML: 200; 200; 20 SUSPENSION ORAL at 05:12

## 2017-12-12 RX ADMIN — FLUOXETINE 20 MG: 20 CAPSULE ORAL at 08:12

## 2017-12-12 RX ADMIN — CEFAZOLIN SODIUM 1 G: 1 INJECTION, SOLUTION INTRAVENOUS at 05:12

## 2017-12-12 RX ADMIN — CEFAZOLIN SODIUM 2 G: 2 SOLUTION INTRAVENOUS at 10:12

## 2017-12-12 RX ADMIN — PHENYLEPHRINE HYDROCHLORIDE 100 MCG: 10 INJECTION INTRAVENOUS at 11:12

## 2017-12-12 RX ADMIN — SODIUM CHLORIDE, SODIUM LACTATE, POTASSIUM CHLORIDE, AND CALCIUM CHLORIDE: .6; .31; .03; .02 INJECTION, SOLUTION INTRAVENOUS at 09:12

## 2017-12-12 RX ADMIN — ACETAMINOPHEN 1000 MG: 10 INJECTION, SOLUTION INTRAVENOUS at 12:12

## 2017-12-12 RX ADMIN — PROPOFOL 20 MG: 10 INJECTION, EMULSION INTRAVENOUS at 10:12

## 2017-12-12 RX ADMIN — EPHEDRINE SULFATE 25 MG: 50 INJECTION, SOLUTION INTRAMUSCULAR; INTRAVENOUS; SUBCUTANEOUS at 10:12

## 2017-12-12 RX ADMIN — PANTOPRAZOLE SODIUM 40 MG: 40 TABLET, DELAYED RELEASE ORAL at 08:12

## 2017-12-12 RX ADMIN — LIDOCAINE HYDROCHLORIDE 40 MG: 20 INJECTION, SOLUTION INTRAVENOUS at 10:12

## 2017-12-12 RX ADMIN — ALBUTEROL SULFATE 2 PUFF: 90 AEROSOL, METERED RESPIRATORY (INHALATION) at 06:12

## 2017-12-12 RX ADMIN — ALUMINUM HYDROXIDE, MAGNESIUM HYDROXIDE, AND SIMETHICONE 30 ML: 200; 200; 20 SUSPENSION ORAL at 09:12

## 2017-12-12 RX ADMIN — DOCUSATE SODIUM AND SENNOSIDES 1 TABLET: 8.6; 5 TABLET, FILM COATED ORAL at 09:12

## 2017-12-12 NOTE — OP NOTE
DATE OF PROCEDURE:  12/12/2017    SURGEON:  Papito Art III, M.D.    ASSISTANT:  Latonya Amaro LPN.    PREOPERATIVE DIAGNOSIS:  Right hip intertrochanteric fracture, reverse oblique.    POSTOPERATIVE DIAGNOSIS:  Right hip intertrochanteric fracture, reverse oblique.    PROCEDURE:  IM nailing, right hip.    INDICATION:  The patient is a 62-year-old female with a history of fall   suffering a right hip intertrochanteric fracture.  She was evaluated and found   to have a reverse oblique intertrochanteric fracture with displacement.  She was   admitted.  I was consulted for management of the hip.  Options were discussed   with the patient including conservative treatment with traction pain medication   and surgery for IM nailing.  After discussing the risks and benefits, she   elected to go forth with IM nailing to try to maintain her ability to ambulate,   was taken to surgery for IM nailing of her hip after consents were obtained.  No   guarantees were made.    PROCEDURE IN DETAIL:  After proper informed consent was obtained, the patient   brought to the operating room and place supine on the operative table.  A spinal   anesthesia was in place.  She was placed in a traction table.  All pressure   points were well padded.  She was placed in a tracking against perineal post.    Right hip was close reduced under fluoroscopy and then prepped and draped in   sterile fashion.  Incision was carried out proximally and dissection taken down   the greater trochanter, a guidewire was inserted, it was reamed with a Synthes   reamer.  A guidewire was inserted down the canal, it was measured for a 340 mm   TFN, it was reamed to 12.5 and a right 340 TFN was inserted over the guidewire.    A pin was placed in the center position of the femoral head with a proximal   locking guide.  It was then measured and reamed and an 85 mm helical blade was   inserted into position.  It was locked for rotation and sliding given the    unstable reverse oblique nature of the fracture.  A single screw was placed   distally with perfect circles technique.  Guide was removed.  It was visualized   under fluoroscopy, found to be appropriate reduction of fracture and placement   of the hardware.  The wounds were then thoroughly irrigated and closed with 0   Vicryl, 2-0 Vicryl and staples.  She was placed in sterile dressings and brought   to Recovery Room in stable condition.  Estimated blood loss was 100 mL.  No   complications were noted.      KBETY/FELY  dd: 12/12/2017 11:56:05 (CST)  td: 12/12/2017 17:48:03 (CST)  Doc ID   #[id number]  Job ID #[id number]    CC:

## 2017-12-12 NOTE — PROGRESS NOTES
Pt on 6l nasal cannula sats 99%the patient wears 6l at home.Pt has decreased bs bilateraly.Pt tolerated respiratory treatment with SCOOBY.

## 2017-12-12 NOTE — PROGRESS NOTES
SW met with patient to introduce self and discuss home situation.  Patient stated that she lives with her son and grandson.  Patient stated that she was on hospice prior to admit with dx of COPD.  Patient was not able to provide the name of her hospice agency.  SW will f/u with the son.  Patient currently hospitalized with a hip fx.  Ally Colin LMSW, DAVE[facility]DAVID, [facility]  12/12/2017

## 2017-12-12 NOTE — CONSULTS
DATE OF CONSULTATION:  2017    CONSULTING PHYSICIAN:  Papito Art III, M.D.    ADMITTING PHYSICIAN:  Odette Blount M.D.    CHIEF COMPLAINT:  Right hip fracture.    HISTORY OF PRESENT ILLNESS:  The patient is a 62-year-old female with a history   of a fall when her daughter actually ran into her, she fell down and suffered   right hip intertrochanteric fracture.  She was previously at home on hospice   because of her pulmonary status.  She presented to the Emergency Room where she   was diagnosed with a right hip intertrochanteric fracture.  She has a history of   COPD and is on home oxygen.  She denies any worsening shortness of breath or   problems.  She was admitted and I was consulted for management of her hip   fracture.    PAST MEDICAL HISTORY:  Anxiety, back pain, bipolar disorder, COPD, degenerative   disease, hypertension, gastroesophageal reflux disease, malnutrition,   osteoporosis, tobacco abuse and weight loss.    PAST SURGICAL HISTORY:   section and hysterectomy.    ALLERGIES:  AVELOX.    HOME MEDICATIONS:  Albuterol, DuoNeb, amlodipine, Abilify, Prozac, Advair,   Ativan, home O2, Deltasone, Spiriva, Desyrel.    FAMILY HISTORY:  Asthma, cancer, heart attack, hypertension and stroke.    SOCIAL HISTORY:  Reveals she is a smoker of three-quarters of pack a day for 40   years.  She consumes alcohol on occasion.    REVIEW OF SYSTEMS:  Positive for arthritis and weight loss.    PHYSICAL EXAMINATION:  Reveals she is a thin female in bed.  Her arms are   nontender to palpation and range of motion.  Her right lower ribs are tender in   the anterior axillary line.  Her left ribs are nontender.  Her abdomen is soft   and nontender.  Pelvis is stable.  Left leg is nontender.  Right leg is tender   about the hip.  She is nontender about the knee and ankle and foot.  She is able   to flex and extend her toes.    Radiographs of her hip reveal intertrochanteric fracture with reverse obliquity   on  the right hip.    ASSESSMENT AND PLAN:  She has a displaced right hip intertrochanteric fracture,   which is reverse oblique.  I think the hip would be best treated with IM   nailing.  Nonoperative treatment with bed rest and pain medication is another   option; however, it would be difficult to mobilize her for several weeks.  She   would most likely not be able to walk on that side following this type of   fracture to heal without surgery, but it is possible to treat her and get her   comfortable nonoperatively.  She wishes to try to remain ambulatory and to go   forth with surgery.  We will set her up for IM nailing of her hip.  Consents   were signed.  No guarantees were made.      KQR/IN  dd: 12/12/2017 08:45:39 (CST)  td: 12/12/2017 09:18:46 (CST)  Doc ID   #[id number]  Job ID #[id number]    CC:

## 2017-12-12 NOTE — TRANSFER OF CARE
Anesthesia Transfer of Care Note    Patient: Rebecca Owen    Procedure(s) Performed: Procedure(s) (LRB):  IM NAILING OF HIP (Right)    Patient location: PACU    Anesthesia Type: spinal and MAC    Transport from OR: Transported from OR on room air with adequate spontaneous ventilation    Post pain: adequate analgesia    Post assessment: no apparent anesthetic complications and tolerated procedure well    Post vital signs: stable    Level of consciousness: awake, alert and oriented    Nausea/Vomiting: no nausea/vomiting    Complications: none    Transfer of care protocol was followed      Last vitals:   Visit Vitals  BP (!) 95/57 (BP Location: Left arm)   Pulse 82   Temp 36.5 °C (97.7 °F) (Tympanic)   Resp 17   Ht 5' (1.524 m)   Wt 44.9 kg (98 lb 14.4 oz)   LMP  (LMP Unknown)   SpO2 98%   Breastfeeding? No   BMI 19.32 kg/m²

## 2017-12-12 NOTE — OR NURSING
Patient awake, alert, on 8L non rebreather, O2 sats 100%.  Right hip and leg dressing dry and intact.  Positive movement and sensation to lower extremities.  Warm to touch, brisk refill. DP/PT palpable

## 2017-12-12 NOTE — BRIEF OP NOTE
Operative Note         SUMMARY     Surgery Date: 12/12/2017     Surgeon(s) and Role:     * Papito Art III, MD - Primary    Pre-op Diagnosis:  R hip IT fx    Post-op Diagnosis: same    Procedure(s) (LRB):  IM NAILING OF HIP (Right)    Anesthesia: Spinal    Findings/Key Components:      Estimated Blood Loss: 100ml         Specimens     None

## 2017-12-12 NOTE — ANESTHESIA PREPROCEDURE EVALUATION
12/12/2017  Rebecca Owen is a 62 y.o., female.    Anesthesia Evaluation     I have reviewed the Nursing Notes.      Review of Systems  Anesthesia Hx:  No problems with previous Anesthesia   Social:  Smoker    Cardiovascular:   Denies Pacemaker. Hypertension  Denies Valvular problems/Murmurs.  Denies MI.  Denies CAD.    Denies CABG/stent.  Denies Dysrhythmias.   Denies Angina.             denies PVD ECG has been reviewed.    Pulmonary:   Denies Pneumonia COPD (O2 dependent), severe Denies Sleep Apnea.    Renal/:  Renal/ Normal     Hepatic/GI:   No Bowel Prep. Denies PUD. GERD Denies Liver Disease. Denies Hepatitis.    Musculoskeletal:   Arthritis   Spine Disorders: Degenerative disease    Neurological:   Denies CVA.   Chronic Pain Syndrome   Endocrine:  Endocrine Normal    Psych:   Psychiatric History          Physical Exam  General:  Cachexia                 Anesthesia Plan  Type of Anesthesia, risks & benefits discussed:  Anesthesia Type:  spinal, general  Patient's Preference:   Intra-op Monitoring Plan: standard ASA monitors  Intra-op Monitoring Plan Comments:   Post Op Pain Control Plan:   Post Op Pain Control Plan Comments:   Induction:   IV  Beta Blocker:  Patient is not currently on a Beta-Blocker (No further documentation required).       Informed Consent:    ASA Score: 4     Day of Surgery Review of History & Physical:    H&P update referred to the surgeon.     Anesthesia Plan Notes: O2-dependent COPD pt. No contraindication to spinal        Ready For Surgery From Anesthesia Perspective.

## 2017-12-12 NOTE — ANESTHESIA PROCEDURE NOTES
Spinal    Diagnosis: right hip fracture  Patient location during procedure: holding area  Start time: 12/12/2017 10:01 AM  Timeout: 12/12/2017 10:00 AM  End time: 12/12/2017 10:08 AM  Staffing  Anesthesiologist: CHARLENE RAGLAND  Performed: anesthesiologist   Preanesthetic Checklist  Completed: patient identified, site marked, surgical consent, pre-op evaluation, timeout performed, IV checked, risks and benefits discussed and monitors and equipment checked  Spinal Block  Patient position: right lateral decubitus  Prep: ChloraPrep  Patient monitoring: heart rate, cardiac monitor and continuous pulse ox  Approach: midline  Location: L4-5  Injection technique: single shot  CSF Fluid: clear free-flowing CSF  Needle  Needle type: Jose   Needle gauge: 25 G  Needle length: 3.5 in  Additional Documentation: negative aspiration for heme and no paresthesia on injection  Needle localization: anatomical landmarks  Assessment  Ease of block: easy  Patient's tolerance of the procedure: comfortable throughout block and no complaints  Medications:  Bolus administered: 2.8 mL of 0.5 bupivacaine  Epinephrine added: none

## 2017-12-12 NOTE — CONSULTS
Dictation #1  MRN:[id number]  CSN:[id number]    Dict # [id number]    63 y/o F with COPD, R hip IT fx, displaced, reverse oblique    Discussed options with pt non-op vs surgery for IM nailing.  She would like to proceed with surgery to try to remain ambulatory.

## 2017-12-13 LAB
ALLENS TEST: ABNORMAL
DELSYS: ABNORMAL
FLOW: 4
HCO3 UR-SCNC: 30.4 MMOL/L (ref 24–28)
MODE: ABNORMAL
PCO2 BLDA: 35.3 MMHG (ref 35–45)
PH SMN: 7.54 [PH] (ref 7.35–7.45)
PO2 BLDA: 53 MMHG (ref 80–100)
POC BE: 7 MMOL/L
POC SATURATED O2: 91 % (ref 95–100)
POC TCO2: 31 MMOL/L (ref 23–27)
POCT GLUCOSE: 123 MG/DL (ref 70–110)
SAMPLE: ABNORMAL
SITE: ABNORMAL

## 2017-12-13 PROCEDURE — 27000221 HC OXYGEN, UP TO 24 HOURS

## 2017-12-13 PROCEDURE — 12000002 HC ACUTE/MED SURGE SEMI-PRIVATE ROOM

## 2017-12-13 PROCEDURE — 93005 ELECTROCARDIOGRAM TRACING: CPT

## 2017-12-13 PROCEDURE — 25000003 PHARM REV CODE 250: Performed by: HOSPITALIST

## 2017-12-13 PROCEDURE — 36600 WITHDRAWAL OF ARTERIAL BLOOD: CPT

## 2017-12-13 PROCEDURE — 97161 PT EVAL LOW COMPLEX 20 MIN: CPT

## 2017-12-13 PROCEDURE — 82803 BLOOD GASES ANY COMBINATION: CPT

## 2017-12-13 PROCEDURE — 93010 ELECTROCARDIOGRAM REPORT: CPT | Mod: GW,HPC,, | Performed by: INTERNAL MEDICINE

## 2017-12-13 PROCEDURE — 94761 N-INVAS EAR/PLS OXIMETRY MLT: CPT

## 2017-12-13 PROCEDURE — 63600175 PHARM REV CODE 636 W HCPCS: Performed by: ORTHOPAEDIC SURGERY

## 2017-12-13 PROCEDURE — 25000003 PHARM REV CODE 250: Performed by: EMERGENCY MEDICINE

## 2017-12-13 PROCEDURE — 97530 THERAPEUTIC ACTIVITIES: CPT

## 2017-12-13 PROCEDURE — 87040 BLOOD CULTURE FOR BACTERIA: CPT | Mod: 59

## 2017-12-13 PROCEDURE — G8988 SELF CARE GOAL STATUS: HCPCS | Mod: CI | Performed by: SPECIALIST

## 2017-12-13 PROCEDURE — 99900035 HC TECH TIME PER 15 MIN (STAT)

## 2017-12-13 PROCEDURE — 25000003 PHARM REV CODE 250: Performed by: INTERNAL MEDICINE

## 2017-12-13 PROCEDURE — 25000242 PHARM REV CODE 250 ALT 637 W/ HCPCS: Performed by: EMERGENCY MEDICINE

## 2017-12-13 PROCEDURE — 94640 AIRWAY INHALATION TREATMENT: CPT

## 2017-12-13 PROCEDURE — 36415 COLL VENOUS BLD VENIPUNCTURE: CPT

## 2017-12-13 PROCEDURE — 25000003 PHARM REV CODE 250: Performed by: ORTHOPAEDIC SURGERY

## 2017-12-13 PROCEDURE — 97165 OT EVAL LOW COMPLEX 30 MIN: CPT | Performed by: SPECIALIST

## 2017-12-13 PROCEDURE — G8987 SELF CARE CURRENT STATUS: HCPCS | Mod: CK | Performed by: SPECIALIST

## 2017-12-13 RX ORDER — TIOTROPIUM BROMIDE 18 UG/1
18 CAPSULE ORAL; RESPIRATORY (INHALATION) DAILY
Status: DISCONTINUED | OUTPATIENT
Start: 2017-12-14 | End: 2017-12-16 | Stop reason: HOSPADM

## 2017-12-13 RX ORDER — PROMETHAZINE HYDROCHLORIDE 25 MG/1
25 TABLET ORAL EVERY 4 HOURS
Status: DISCONTINUED | OUTPATIENT
Start: 2017-12-13 | End: 2017-12-14

## 2017-12-13 RX ORDER — CYCLOBENZAPRINE HCL 10 MG
10 TABLET ORAL 3 TIMES DAILY PRN
Status: DISCONTINUED | OUTPATIENT
Start: 2017-12-13 | End: 2017-12-16 | Stop reason: HOSPADM

## 2017-12-13 RX ORDER — ACETAMINOPHEN 325 MG/1
650 TABLET ORAL EVERY 6 HOURS PRN
Status: DISCONTINUED | OUTPATIENT
Start: 2017-12-13 | End: 2017-12-16 | Stop reason: HOSPADM

## 2017-12-13 RX ORDER — FENTANYL 25 UG/1
1 PATCH TRANSDERMAL
Status: DISCONTINUED | OUTPATIENT
Start: 2017-12-13 | End: 2017-12-13

## 2017-12-13 RX ADMIN — OXYCODONE HYDROCHLORIDE 30 MG: 10 TABLET, FILM COATED, EXTENDED RELEASE ORAL at 09:12

## 2017-12-13 RX ADMIN — AMLODIPINE BESYLATE 10 MG: 5 TABLET ORAL at 09:12

## 2017-12-13 RX ADMIN — FLUOXETINE 20 MG: 20 CAPSULE ORAL at 09:12

## 2017-12-13 RX ADMIN — IPRATROPIUM BROMIDE AND ALBUTEROL SULFATE 3 ML: .5; 3 SOLUTION RESPIRATORY (INHALATION) at 02:12

## 2017-12-13 RX ADMIN — ENOXAPARIN SODIUM 30 MG: 100 INJECTION SUBCUTANEOUS at 09:12

## 2017-12-13 RX ADMIN — ACETAMINOPHEN 650 MG: 325 TABLET ORAL at 09:12

## 2017-12-13 RX ADMIN — DOCUSATE SODIUM AND SENNOSIDES 1 TABLET: 8.6; 5 TABLET, FILM COATED ORAL at 09:12

## 2017-12-13 RX ADMIN — CEFAZOLIN SODIUM 1 G: 1 INJECTION, SOLUTION INTRAVENOUS at 04:12

## 2017-12-13 RX ADMIN — ALBUTEROL SULFATE 2 PUFF: 90 AEROSOL, METERED RESPIRATORY (INHALATION) at 07:12

## 2017-12-13 RX ADMIN — ARIPIPRAZOLE 15 MG: 5 TABLET ORAL at 09:12

## 2017-12-13 RX ADMIN — LORAZEPAM 1 MG: 0.5 TABLET ORAL at 11:12

## 2017-12-13 RX ADMIN — ACETAMINOPHEN 1000 MG: 10 INJECTION, SOLUTION INTRAVENOUS at 05:12

## 2017-12-13 RX ADMIN — TRAZODONE HYDROCHLORIDE 400 MG: 50 TABLET ORAL at 09:12

## 2017-12-13 RX ADMIN — IPRATROPIUM BROMIDE AND ALBUTEROL SULFATE 3 ML: .5; 3 SOLUTION RESPIRATORY (INHALATION) at 04:12

## 2017-12-13 RX ADMIN — PROMETHAZINE HYDROCHLORIDE 25 MG: 25 TABLET ORAL at 09:12

## 2017-12-13 RX ADMIN — PANTOPRAZOLE SODIUM 40 MG: 40 TABLET, DELAYED RELEASE ORAL at 09:12

## 2017-12-13 RX ADMIN — POLYETHYLENE GLYCOL (3350) 17 G: 17 POWDER, FOR SOLUTION ORAL at 09:12

## 2017-12-13 RX ADMIN — PROMETHAZINE HYDROCHLORIDE 25 MG: 25 TABLET ORAL at 06:12

## 2017-12-13 RX ADMIN — OXYBUTYNIN CHLORIDE 5 MG: 5 TABLET ORAL at 05:12

## 2017-12-13 RX ADMIN — IPRATROPIUM BROMIDE AND ALBUTEROL SULFATE 3 ML: .5; 3 SOLUTION RESPIRATORY (INHALATION) at 11:12

## 2017-12-13 RX ADMIN — ALUMINUM HYDROXIDE, MAGNESIUM HYDROXIDE, AND SIMETHICONE 30 ML: 200; 200; 20 SUSPENSION ORAL at 04:12

## 2017-12-13 RX ADMIN — OXYBUTYNIN CHLORIDE 5 MG: 5 TABLET ORAL at 09:12

## 2017-12-13 RX ADMIN — GUAIFENESIN 1200 MG: 600 TABLET, EXTENDED RELEASE ORAL at 09:12

## 2017-12-13 RX ADMIN — IPRATROPIUM BROMIDE AND ALBUTEROL SULFATE 3 ML: .5; 3 SOLUTION RESPIRATORY (INHALATION) at 06:12

## 2017-12-13 RX ADMIN — FLUTICASONE FUROATE AND VILANTEROL TRIFENATATE 1 PUFF: 200; 25 POWDER RESPIRATORY (INHALATION) at 07:12

## 2017-12-13 RX ADMIN — OXYCODONE HYDROCHLORIDE 5 MG: 5 TABLET ORAL at 06:12

## 2017-12-13 RX ADMIN — ALUMINUM HYDROXIDE, MAGNESIUM HYDROXIDE, AND SIMETHICONE 30 ML: 200; 200; 20 SUSPENSION ORAL at 11:12

## 2017-12-13 RX ADMIN — OXYBUTYNIN CHLORIDE 5 MG: 5 TABLET ORAL at 03:12

## 2017-12-13 RX ADMIN — CEFAZOLIN SODIUM 1 G: 1 INJECTION, SOLUTION INTRAVENOUS at 11:12

## 2017-12-13 NOTE — PROGRESS NOTES
Pt in bed awake, alert pain controlled          Temp:  [97.7 °F (36.5 °C)-99 °F (37.2 °C)] 98.4 °F (36.9 °C)  Pulse:  [] 93  Resp:  [10-25] 16  SpO2:  [90 %-100 %] 95 %  BP: ()/(54-71) 125/70        PE:    R hip dressings clean and dry, moves foot well      No results for input(s): WBC, RBC, HGB, HCT, PLT, MCV, MCH, MCHC in the last 24 hours.      Current Facility-Administered Medications:     albuterol inhaler 2 puff, 2 puff, Inhalation, Q6H PRN, Ham Matias Jr., MD, 2 puff at 12/13/17 0738    albuterol-ipratropium 2.5mg-0.5mg/3mL nebulizer solution 3 mL, 3 mL, Nebulization, Q6H PRN, Ham Matias Jr., MD, 3 mL at 12/13/17 0457    aluminum-magnesium hydroxide-simethicone 200-200-20 mg/5 mL suspension 30 mL, 30 mL, Oral, QID (AC & HS), Ham Matias Jr., MD, 30 mL at 12/12/17 2136    amLODIPine tablet 10 mg, 10 mg, Oral, Daily, Ham Matias Jr., MD, Stopped at 12/12/17 0837    ARIPiprazole tablet 15 mg, 15 mg, Oral, Nightly, Ham Matias Jr., MD, 15 mg at 12/12/17 2130    benzonatate capsule 100 mg, 100 mg, Oral, TID PRN, Ham Matias Jr., MD    ceFAZolin (ANCEF) 1 gram in dextrose 5 % 50 mL IVPB (premix), 1 g, Intravenous, Q6H, Papito Art III, MD, Last Rate: 100 mL/hr at 12/13/17 0442, 1 g at 12/13/17 0442    enoxaparin injection 30 mg, 30 mg, Subcutaneous, Daily, Papito Art III, MD    FLUoxetine capsule 20 mg, 20 mg, Oral, Daily, Ham Matias Jr., MD, 20 mg at 12/12/17 0838    fluticasone-vilanterol 200-25 mcg/dose diskus inhaler 1 puff, 1 puff, Inhalation, Daily, Ham Matias Jr., MD, 1 puff at 12/13/17 0736    guaiFENesin 12 hr tablet 1,200 mg, 1,200 mg, Oral, BID, Ham Matias Jr., MD, 1,200 mg at 12/12/17 2130    LORazepam tablet 1 mg, 1 mg, Oral, Q8H PRN, Ham Matias Jr., MD, 1 mg at 12/12/17 1912    morphine injection 4 mg, 4 mg, Intravenous, Q4H PRN, Papito Art III, MD, 4 mg at 12/12/17 1722    oxybutynin  tablet 5 mg, 5 mg, Oral, TID, Ham Matias Jr., MD, 5 mg at 12/13/17 0528    oxyCODONE 12 hr tablet 30 mg, 30 mg, Oral, Q12H, Ham Matias Jr., MD, 30 mg at 12/12/17 2130    oxyCODONE immediate release tablet 5 mg, 5 mg, Oral, Q4H PRN, Papito Art III, MD    pantoprazole EC tablet 40 mg, 40 mg, Oral, Daily, Ham Matias Jr., MD, 40 mg at 12/12/17 0837    polyethylene glycol packet 17 g, 17 g, Oral, Daily, Ham Matias Jr., MD, 17 g at 12/12/17 0836    senna-docusate 8.6-50 mg per tablet 1 tablet, 1 tablet, Oral, Daily, Ham Matias Jr., MD, 1 tablet at 12/12/17 0835    senna-docusate 8.6-50 mg per tablet 1 tablet, 1 tablet, Oral, BID, Ham Matias Jr., MD, 1 tablet at 12/12/17 2130    traZODone tablet 400 mg, 400 mg, Oral, QHS, Ham Matias Jr., MD, 400 mg at 12/12/17 2131      A/P:    R hip IT fx, rev oblique, s/p IM nailing 12/12    COPD on O2    PT - WBAT    Placement - SNF vs hospice    dvt proph - lovenox, TEDs

## 2017-12-13 NOTE — PLAN OF CARE
Problem: Occupational Therapy Goal  Goal: Occupational Therapy Goal  Goals to be met by: 01/10/18     Patient will increase functional independence with ADLs by performing:    UE Dressing with Set-up Assistance.  LE Dressing with Minimal Assistance and Assistive Devices as needed.  Grooming while standing with Contact Guard Assistance.  Toileting from bedside commode with Supervision for hygiene and clothing management.   Toilet transfer to bedside commode with Supervision.  Upper extremity exercise program x 10 reps per handout, with supervision.     Outcome: Ongoing (interventions implemented as appropriate)  OT evaluation complete.  Pt will benefit from HH vs back to hospice.  No recommended DME.  Will have family to assist at home.

## 2017-12-13 NOTE — PT/OT/SLP EVAL
Physical Therapy Evaluation    Patient Name:  Rebecca Owen   MRN:  [id number]    Recommendations:     Discharge Recommendations:  home health PT   Discharge Equipment Recommendations: none   Barriers to discharge: patient on hospice prior to coming into the hospital     Assessment:     Rebecca Owen is a 62 y.o. female admitted with a medical diagnosis of Closed fracture of right hip.  She presents with the following impairments/functional limitations:  weakness, impaired endurance, impaired functional mobilty, gait instability, impaired balance, decreased lower extremity function, decreased safety awareness, pain, impaired cardiopulmonary response to activity. Patient unable to ambulate due to increased pain to right hip with weight bearing.     Rehab Prognosis:  fair; patient would benefit from acute skilled PT services to address these deficits and reach maximum level of function.      Recent Surgery: Procedure(s) (LRB):  IM NAILING OF HIP (Right) 1 Day Post-Op    Plan:     During this hospitalization, patient to be seen daily to address the above listed problems via gait training, therapeutic activities, therapeutic exercises  · Plan of Care Expires:  12/20/17   Plan of Care Reviewed with: patient    Subjective     Communicated with nurse Rey prior to session.  Patient found reclined in bedside chair upon PT entry to room, agreeable to evaluation. Nurse notified that patient requesting anxiety medication prior to PT evaluation.      Chief Complaint: Pain in right hip.   Patient comments/goals: To walk.   Pain/Comfort:  Pain Rating 1: 6/10  Location - Side 1: Right  Location 1: leg  Pain Addressed 1: Pre-medicate for activity, Reposition, Distraction, Cessation of Activity, Nurse notified  Pain Rating Post-Intervention 1: other (see comments) (25/10 when trying to ambulate)    Living Environment:  Patient lives at home with her daughter and adult grandson. She has been on hospice for 2  years and is only able to ambulate short distances within her house. She stated that a wheelchair will fit through all the doorways in her house.   Prior to admission, patients level of function was independent for very short distance ambulation. She was limited in ambulation due to her COPD. Patient has the following equipment: walker, rolling, wheelchair, oxygen, hospital bed. Upon discharge, patient will have assistance from her daughter and adult grandson.    Objective:     Patient found with: peripheral IV, oxygen (3L NC O2)     General Precautions: Standard, fall, respiratory   Orthopedic Precautions:RLE weight bearing as tolerated   Braces: N/A     Exams:  · Cognitive Exam:  Patient is oriented to Person, Place and Situation and follows 75% of simple commands   · Gross Motor Coordination:  WFL  · Postural Exam:  Patient presented with the following abnormalities:    · -       Rounded shoulders  · -       Forward head  · Sensation:    · -       Intact  · Skin Integrity:      · -       Visible skin intact with surgical dressing intact to right hip incisions   · RLE ROM: WFL except limited at hip due to pain  · RLE Strength: WFL except limited at hip due to pain  · LLE ROM: WFL  · LLE Strength: WFL    Functional Mobility:  · Bed Mobility:     · Scooting: stand by assistance  · Sit to Supine: minimum assistance  · Transfers:     · Sit to Stand:  contact guard assistance with rolling walker  · Bedside Chair to bed: contact guard assistance with no AD using Stand Pivot  · Toilet Transfer from bedside chair to bedside commode and back: contact guard assistance with  no AD  using  Stand Pivot  · Gait: patient only able to take steps with RW and CGA then declined further ambulation due to 25/10 right leg pain.     AM-PAC 6 CLICK MOBILITY  Total Score:17     Therapeutic Activities and Exercises:   Patient performed B LE seated therex x10 reps for A/P and LAQ.     Patient left left sidelying with all lines intact, call  button in reach and nurse notified.    GOALS:    Physical Therapy Goals        Problem: Physical Therapy Goal    Goal Priority Disciplines Outcome Goal Variances Interventions   Physical Therapy Goal     PT/OT, PT      Description:  Goals to be met by: 17    Patient will increase functional independence with mobility by performin. Supine to sit with supervision  2. Sit to stand transfer with supervision  3. Bed to chair transfer with supervision using Rolling Walker  4. Gait x50 feet with supervision using Rolling Walker  5. Lower extremity exercise program x20 reps per handout, with supervision                      History:     Past Medical History:   Diagnosis Date    Anxiety reaction     Back pain     neck injury--disc problems    Bipolar disorder     COPD (chronic obstructive pulmonary disease)     DJD (degenerative joint disease)     Essential hypertension 2013    GERD (gastroesophageal reflux disease)     Mild protein malnutrition 6/10/2015    Osteoporosis, unspecified     Tobacco abuse     Weight loss        Past Surgical History:   Procedure Laterality Date     SECTION, CLASSIC      x 1    HYSTERECTOMY       Time Tracking:     PT Received On: 17  PT Start Time: 1120     PT Stop Time: 1147  PT Total Time (min): 27 min     Billable Minutes: Evaluation  14 and Therapeutic Activity 13    Nissa Gregory, PT  2017

## 2017-12-13 NOTE — PLAN OF CARE
Problem: Fall Risk (Adult)  Goal: Absence of Falls  Patient will demonstrate the desired outcomes by discharge/transition of care.   Outcome: Ongoing (interventions implemented as appropriate)    12/11/17 1842   Fall Risk (Adult)   Absence of Falls making progress toward outcome      Patient remained free of falls.  Personal items and call light within reach.  Clutter free environment.      Problem: Patient Care Overview  Goal: Plan of Care Review  Outcome: Ongoing (interventions implemented as appropriate)    12/11/17 1842   Coping/Psychosocial   Plan Of Care Reviewed With patient      Patient VS WNL for patient.  No postop complications and frequent incision site checks.  Medicated as needed for pain. IV antibiotics administered as ordered.  Diet restarted.      Problem: Pressure Ulcer Risk (Mark Scale) (Adult,Obstetrics,Pediatric)  Goal: Skin Integrity  Patient will demonstrate the desired outcomes by discharge/transition of care.   Outcome: Ongoing (interventions implemented as appropriate)    12/12/17 1842   Pressure Ulcer Risk (Mark Scale) (Adult,Obstetrics,Pediatric)   Skin Integrity making progress toward outcome      Encouraged frequent weight adjustment and turned patient frequently.      Problem: Fractured Hip (Adult)  Goal: Signs and Symptoms of Listed Potential Problems Will be Absent, Minimized or Managed (Fractured Hip)  Signs and symptoms of listed potential problems will be absent, minimized or managed by discharge/transition of care (reference Fractured Hip (Adult) CPG).  Outcome: Ongoing (intervention implemented as appropriate)  Date Met: 12/12/17 12/11/17 1842   Fractured Hip   Problems Assessed (Fractured Hip) all      Traction in place w/ 10 lbs.  Frequent monitoring.      Problem: Pain, Acute (Adult)  Goal: Acceptable Pain Control/Comfort Level  Patient will demonstrate the desired outcomes by discharge/transition of care.  Outcome: Ongoing (interventions implemented as appropriate)     12/11/17 1842   Pain, Acute (Adult)   Acceptable Pain Control/Comfort Level making progress toward outcome      Medicated as needed for pain.

## 2017-12-13 NOTE — PLAN OF CARE
Problem: Physical Therapy Goal  Goal: Physical Therapy Goal  Goals to be met by: 17    Patient will increase functional independence with mobility by performin. Supine to sit with supervision  2. Sit to stand transfer with supervision  3. Bed to chair transfer with supervision using Rolling Walker  4. Gait x50 feet with supervision using Rolling Walker  5. Lower extremity exercise program x20 reps per handout, with supervision      Patient would continue to benefit from PT while in the hospital.

## 2017-12-13 NOTE — PROGRESS NOTES
[facility] Hospital Medicine  Progress Note    Patient Name: Rebecca Owen  MRN: [id number]  Patient Class: IP- Inpatient   Admission Date: 12/11/2017  Length of Stay: 1 days  Attending Physician: Odette Blount MD  Primary Care Provider: Asa Weinstein MD        Subjective:     Principal Problem:Closed fracture of right hip    HPI:  Mrs. Owen is a 61 yo F who presents to the ED with a CC or R hip pain. The patient notes that her daughter was having a seizure- she tried catching her and landed on her R hip. She presents to the ED and is found to have a R hip fracture. The patient has a known history of COPD and is on home 02.  She does not have any other complaints. No worsening SOB. No CP.  Ortho has been consulted and may take the patient to surgery later.     Hospital Course:  The patient was admitted to the hospital for evaluation and treatment of a R hip fracture. Ortho was consulted.     Interval History: s/p IM nailing and doing very well. Stable from breathing standpoint. Tolerated diet well    Review of Systems   Constitutional: Negative for activity change, appetite change, chills, diaphoresis and fever.   Eyes: Negative for discharge.   Respiratory: Negative for apnea, cough, choking, chest tightness, shortness of breath, wheezing and stridor.    Cardiovascular: Negative for chest pain, palpitations and leg swelling.   Gastrointestinal: Negative for abdominal pain, constipation, diarrhea, nausea and vomiting.   Genitourinary: Negative for difficulty urinating.   Musculoskeletal: Positive for arthralgias.   Psychiatric/Behavioral: Negative for agitation and behavioral problems.     Objective:     Vital Signs (Most Recent):  Temp: 99 °F (37.2 °C) (12/12/17 2029)  Pulse: 87 (12/12/17 2029)  Resp: 18 (12/12/17 2029)  BP: 122/69 (12/12/17 2029)  SpO2: 96 % (12/12/17 2029) Vital Signs (24h Range):  Temp:  [97.7 °F (36.5 °C)-99.1 °F (37.3 °C)] 99 °F (37.2 °C)  Pulse:  [66-92]  87  Resp:  [10-25] 18  SpO2:  [90 %-100 %] 96 %  BP: ()/(50-69) 122/69     Weight: 44.9 kg (98 lb 14.4 oz)  Body mass index is 19.32 kg/m².    Intake/Output Summary (Last 24 hours) at 12/12/17 2128  Last data filed at 12/12/17 1800   Gross per 24 hour   Intake             1250 ml   Output              625 ml   Net              625 ml      Physical Exam   Constitutional: She appears well-developed and well-nourished.   HENT:   Head: Normocephalic and atraumatic.   Cardiovascular: Normal rate.  Exam reveals no friction rub.    Pulmonary/Chest: Effort normal and breath sounds normal. No respiratory distress. She has no wheezes. She has no rales. She exhibits no tenderness.   Abdominal: Soft. Bowel sounds are normal. There is no tenderness. There is no rebound and no guarding.   Musculoskeletal:   Bandage over incision site.    Neurological: She is alert.   Skin: Skin is warm and dry.   Psychiatric: She has a normal mood and affect. Her behavior is normal.   Vitals reviewed.      Significant Labs: All pertinent labs within the past 24 hours have been reviewed.    Significant Imaging: I have reviewed all pertinent imaging results/findings within the past 24 hours.  I have reviewed and interpreted all pertinent imaging results/findings within the past 24 hours.    Assessment/Plan:      * Closed fracture of right hip    Traumatic fracture. Underwent IM nailing without complications on 12/12. Pain well managed and stable from respiratory standpoint. DVT proph and PT when ok by ortho.           Chronic respiratory failure with hypoxia    On Home 02. Still smokes.           DJD (degenerative joint disease)    No acute issues.           Anxiety    Resume home meds.           Borderline hypertension    Well controlled on BP meds.           Hypokalemia    Minimal. Will follow           Mild protein malnutrition    Will discuss with patient .          Bipolar 1 disorder    Resume home meds.          Tobacco abuse     Continues to smoke. Discussed with patient           GERD (gastroesophageal reflux disease)    Resume home meds.             VTE Risk Mitigation         Ordered     enoxaparin injection 30 mg  Daily     Route:  Subcutaneous        12/12/17 1357     Medium Risk of VTE  Once      12/11/17 1625     Place NOEMY hose  Until discontinued      12/11/17 1625              Odette Harris MD  Department of Hospital Medicine   [facility]

## 2017-12-13 NOTE — PLAN OF CARE
12/13/17 1219   Discharge Assessment   Assessment Type Discharge Planning Assessment   Confirmed/corrected address and phone number on facesheet? Yes   Assessment information obtained from? Medical Record   Expected Length of Stay (days) (TBD)   Communicated expected length of stay with patient/caregiver no   Prior to hospitilization cognitive status: Alert/Oriented   Prior to hospitalization functional status: Assistive Equipment;Needs Assistance   Current cognitive status: Alert/Oriented   Current Functional Status: Assistive Equipment;Needs Assistance   Lives With child(griffin), adult   Able to Return to Prior Arrangements yes   Is patient able to care for self after discharge? Unable to determine at this time (comments)  (Will need assistance.)   Patient's perception of discharge disposition other (comments)  (Patient on hospice for COPD.  Disposition to be determined)   Readmission Within The Last 30 Days no previous admission in last 30 days   Patient currently being followed by outpatient case management? No   Patient currently receives any other outside agency services? No   Equipment Currently Used at Home walker, rolling;oxygen;hospital bed   Do you have any problems affording any of your prescribed medications? No   Is the patient taking medications as prescribed? yes   Does the patient have transportation home? Yes   Transportation Available van, wheelchair accessible  (TBD)   Does the patient receive services at the [facility]? No   Discharge Plan A (TBD)   Discharge Plan B Hospice/home   Patient/Family In Agreement With Plan yes

## 2017-12-13 NOTE — PT/OT/SLP EVAL
Occupational Therapy   Evaluation    Name: Rebecca Owen  MRN: [id number]  Admitting Diagnosis:  Closed fracture of right hip 1 Day Post-Op    Recommendations:     Discharge Recommendations: home with home health, home with hospice  Discharge Equipment Recommendations:  none  Barriers to discharge:  None    History:     Occupational Profile:  Living Environment: Pt lives with daughter and grandson in [place] with no ZOHREH.  Pt currently on hospice 2* COPD.  Previous level of function: Supervision most ADLs; reports an aide comes to assist her with t/f to bathtub and supervises her while she bathes.  Roles and Routines: Pt stays home 2* on hospice; family assist with IADLs  Equipment Owned:  oxygen, hospital bed, bedside commode, walker, rolling, wheelchair  Assistance upon Discharge: daughter, aide for bathing    Past Medical History:   Diagnosis Date    Anxiety reaction     Back pain     neck injury--disc problems    Bipolar disorder     COPD (chronic obstructive pulmonary disease)     DJD (degenerative joint disease)     Essential hypertension 2013    GERD (gastroesophageal reflux disease)     Mild protein malnutrition 6/10/2015    Osteoporosis, unspecified     Tobacco abuse     Weight loss        Past Surgical History:   Procedure Laterality Date     SECTION, CLASSIC      x 1    HYSTERECTOMY         Subjective     Chief Complaint: SOB  Patient/Family stated goals: To go home  Communicated with: RN (Krissy) prior to session.  Pain/Comfort:  · Pain Rating 1:  (Pt did not give number; grimaces with movement)  · Location - Side 1: Right  · Location - Orientation 1: upper  · Location 1: leg  · Pain Addressed 1: Reposition, Distraction, Cessation of Activity, Nurse notified  · Pain Rating Post-Intervention 1: 0/10    Objective:     Patient found with: oxygen (3L via NC)    General Precautions: Standard, fall, respiratory   Orthopedic Precautions:RLE weight bearing as tolerated   Braces: N/A      Occupational Performance:    Bed Mobility:    · Patient completed Scooting/Bridging with supervision  · Patient completed Supine to Sit with minimum assistance  · Patient completed Sit to Supine with minimum assistance    Functional Mobility/Transfers:  · Patient completed Sit <> Stand Transfer with contact guard assistance  with  rolling walker   · Patient completed Bed <> Chair Transfer using Stand Pivot technique with contact guard assistance with rolling walker; pt did not put weight on R LE; scooted L foot to pivot to chair    Activities of Daily Living:  · Grooming: supervision seated EOB  · UB Dressing: minimum assistance donning gown as robe  · LB Dressing: total assistance pee socks  · Toileting: maximal assistance bedpan    Cognitive/Visual Perceptual:  Cognitive/Psychosocial Skills:     -       Oriented to: Person, Place, Time and Situation   -       Follows Commands/attention:Easily distracted and Follows one-step commands  -       Communication: clear/fluent  -       Memory: No Deficits noted  -       Safety awareness/insight to disability: impaired   -       Mood/Affect/Coping skills/emotional control: Anxious  Visual/Perceptual:      -Intact    Physical Exam:  Balance:    -       supervision for static/dynamic sitting balance; CGA for static standing  Upper Extremity Range of Motion:     -       Right Upper Extremity: WFL  -       Left Upper Extremity: WFL  Upper Extremity Strength:    -       Right Upper Extremity: WFL  -       Left Upper Extremity: WFL  Fine Motor Coordination:    -       Intact  Gross motor coordination:   WFL      Patient left up in chair with all lines intact, call button in reach and RN (Krissy) notified    AMPA 6 Click:  AMPA Total Score: 15      Education:    Assessment:     Rebecca Owen is a 62 y.o. female with a medical diagnosis of Closed fracture of right hip.  She presents with fair tolerance to OT evaluation.  Pt easily distracted 2* anxiety and  "difficulty putting weight on R LE.  Performance deficits affecting function are weakness, impaired endurance, impaired self care skills, impaired functional mobilty, impaired balance, decreased lower extremity function, pain, decreased ROM, decreased safety awareness, impaired cardiopulmonary response to activity, orthopedic precautions.      Rehab Prognosis:  Fair; patient would benefit from acute skilled OT services to address these deficits and reach maximum level of function.         Clinical Decision Makin.  OT Low:  "Pt evaluation falls under low complexity for evaluation coding due to performance deficits noted in 1-3 areas as stated above and 0 co-morbities affecting current functional status. Data obtained from problem focused assessments. No modifications or assistance was required for completion of evaluation. Only brief occupational profile and history review completed."     Plan:     Patient to be seen 3 - 5 x/week to address the above listed problems via self-care/home management, therapeutic activities, therapeutic exercises  · Plan of Care Expires: 01/10/18  · Plan of Care Reviewed with: patient    This Plan of care has been discussed with the patient who was involved in its development and understands and is in agreement with the identified goals and treatment plan    GOALS:    Occupational Therapy Goals        Problem: Occupational Therapy Goal    Goal Priority Disciplines Outcome Interventions   Occupational Therapy Goal     OT, PT/OT     Description:  Goals to be met by: 01/10/18     Patient will increase functional independence with ADLs by performing:    UE Dressing with Set-up Assistance.  LE Dressing with Minimal Assistance and Assistive Devices as needed.  Grooming while standing with Contact Guard Assistance.  Toileting from bedside commode with Supervision for hygiene and clothing management.   Toilet transfer to bedside commode with Supervision.  Upper extremity exercise program x " 10 reps per handout, with supervision.                      Time Tracking:     OT Date of Treatment: 12/13/17  OT Start Time: 1028  OT Stop Time: 1049  OT Total Time (min): 21 min    Billable Minutes:Evaluation 21    Marcia Black OT  12/13/2017

## 2017-12-13 NOTE — PLAN OF CARE
Problem: Fall Risk (Adult)  Intervention: Safety Promotion/Fall Prevention  Patient will be free from fall during hospital stay. As bed remains locked, in lowest position will call light in reach will monitor.    12/13/17 1702   Safety Interventions   Safety Promotion/Fall Prevention assistive device/personal item within reach;bed alarm set;instructed to call staff for mobility;side rails raised x 2;nonskid shoes/socks when out of bed;medications reviewed;muscle strengthening facilitated;Fall Risk reviewed with patient/family

## 2017-12-13 NOTE — PLAN OF CARE
Problem: Fall Risk (Adult)  Goal: Absence of Falls  Patient will demonstrate the desired outcomes by discharge/transition of care.   Outcome: Ongoing (interventions implemented as appropriate)   12/12/17 1842   Fall Risk (Adult)   Absence of Falls making progress toward outcome     Patient remained free of falls.  Personal items and call light within reach.  Clutter free environment.     Problem: Patient Care Overview  Goal: Plan of Care Review  Outcome: Ongoing (interventions implemented as appropriate)   12/12/17 1842   Coping/Psychosocial   Plan Of Care Reviewed With patient     Patient VS WNL for patient.  No postop complications and frequent incision site checks.  Medicated as needed for pain. IV antibiotics administered as ordered.  Diet restarted.     Problem: Pressure Ulcer Risk (Mark Scale) (Adult,Obstetrics,Pediatric)  Goal: Skin Integrity  Patient will demonstrate the desired outcomes by discharge/transition of care.   Outcome: Ongoing (interventions implemented as appropriate)   12/12/17 1842   Pressure Ulcer Risk (Mark Scale) (Adult,Obstetrics,Pediatric)   Skin Integrity making progress toward outcome     Encouraged frequent weight adjustment and turned patient frequently.     Problem: Fractured Hip (Adult)  Goal: Signs and Symptoms of Listed Potential Problems Will be Absent, Minimized or Managed (Fractured Hip)  Signs and symptoms of listed potential problems will be absent, minimized or managed by discharge/transition of care (reference Fractured Hip (Adult) CPG).  Outcome: Outcome(s) achieved Date Met: 12/12/17 12/12/17 1842   Fractured Hip   Problems Assessed (Fractured Hip) all     IM nailing R hip performed today.  Monitoring surgical dressing.     Problem: Pain, Acute (Adult)  Goal: Acceptable Pain Control/Comfort Level  Patient will demonstrate the desired outcomes by discharge/transition of care.  Outcome: Ongoing (interventions implemented as appropriate)   12/12/17 1842   Pain, Acute  (Adult)   Acceptable Pain Control/Comfort Level making progress toward outcome     Medicated as needed for pain.

## 2017-12-13 NOTE — SUBJECTIVE & OBJECTIVE
Interval History: s/p IM nailing and doing very well. Stable from breathing standpoint. Tolerated diet well    Review of Systems   Constitutional: Negative for activity change, appetite change, chills, diaphoresis and fever.   Eyes: Negative for discharge.   Respiratory: Negative for apnea, cough, choking, chest tightness, shortness of breath, wheezing and stridor.    Cardiovascular: Negative for chest pain, palpitations and leg swelling.   Gastrointestinal: Negative for abdominal pain, constipation, diarrhea, nausea and vomiting.   Genitourinary: Negative for difficulty urinating.   Musculoskeletal: Positive for arthralgias.   Psychiatric/Behavioral: Negative for agitation and behavioral problems.     Objective:     Vital Signs (Most Recent):  Temp: 99 °F (37.2 °C) (12/12/17 2029)  Pulse: 87 (12/12/17 2029)  Resp: 18 (12/12/17 2029)  BP: 122/69 (12/12/17 2029)  SpO2: 96 % (12/12/17 2029) Vital Signs (24h Range):  Temp:  [97.7 °F (36.5 °C)-99.1 °F (37.3 °C)] 99 °F (37.2 °C)  Pulse:  [66-92] 87  Resp:  [10-25] 18  SpO2:  [90 %-100 %] 96 %  BP: ()/(50-69) 122/69     Weight: 44.9 kg (98 lb 14.4 oz)  Body mass index is 19.32 kg/m².    Intake/Output Summary (Last 24 hours) at 12/12/17 2128  Last data filed at 12/12/17 1800   Gross per 24 hour   Intake             1250 ml   Output              625 ml   Net              625 ml      Physical Exam   Constitutional: She appears well-developed and well-nourished.   HENT:   Head: Normocephalic and atraumatic.   Cardiovascular: Normal rate.  Exam reveals no friction rub.    Pulmonary/Chest: Effort normal and breath sounds normal. No respiratory distress. She has no wheezes. She has no rales. She exhibits no tenderness.   Abdominal: Soft. Bowel sounds are normal. There is no tenderness. There is no rebound and no guarding.   Musculoskeletal:   Bandage over incision site.    Neurological: She is alert.   Skin: Skin is warm and dry.   Psychiatric: She has a normal mood and  affect. Her behavior is normal.   Vitals reviewed.      Significant Labs: All pertinent labs within the past 24 hours have been reviewed.    Significant Imaging: I have reviewed all pertinent imaging results/findings within the past 24 hours.  I have reviewed and interpreted all pertinent imaging results/findings within the past 24 hours.

## 2017-12-13 NOTE — NURSING
Pt has remained free from falls and or signs of infection this shift. She is on Hospice for end stage COPD and hope to resume post discharge. Pt has voiced adequate pain control. Bed is locked and low with call light within reach, side rails up x 2. Will continue to monitor.

## 2017-12-13 NOTE — ASSESSMENT & PLAN NOTE
Traumatic fracture. Underwent IM nailing without complications on 12/12. Pain well managed and stable from respiratory standpoint. DVT proph and PT when ok by ortho.

## 2017-12-13 NOTE — PLAN OF CARE
Problem: Patient Care Overview  Goal: Plan of Care Review  Outcome: Ongoing (interventions implemented as appropriate)  Mary Jane Ann, RN Registered Nurse Signed Med/Surg  Nursing          []Hide copied text  []Hover for attribution information  Pt has remained free from falls and or signs of infection this shift. She is on Hospice for end stage COPD and hope to resume post discharge. Pt has voiced adequate pain control. Bed is locked and low with call light within reach, side rails up x 2. Will continue to monitor.

## 2017-12-13 NOTE — PROGRESS NOTES
Patient currently on service with [facility].  Ally Colin LMSW, ACADRIÁN-DAVID, CCM  12/13/2017

## 2017-12-13 NOTE — ANESTHESIA POSTPROCEDURE EVALUATION
Anesthesia Post Evaluation    Patient: Rebecca Owen    Procedure(s) Performed: Procedure(s) (LRB):  IM NAILING OF HIP (Right)    Final Anesthesia Type: spinal  Patient location during evaluation: PACU  Patient participation: Yes- Able to Participate  Level of consciousness: awake and alert and oriented  Post-procedure vital signs: reviewed and stable  Pain management: adequate  Airway patency: patent  PONV status at discharge: No PONV  Anesthetic complications: no      Cardiovascular status: blood pressure returned to baseline and hemodynamically stable  Respiratory status: spontaneous ventilation and nasal cannula  Hydration status: euvolemic  Follow-up not needed.  Comments: Pt's bp was 105 systolic when she arrived in the holding area, but she was asymptomatic. After procedure her bp was again low but again she was asymptomatic and the bp and HR were stable.        Visit Vitals  /70   Pulse 86   Temp 36.9 °C (98.4 °F)   Resp 19   Ht 5' (1.524 m)   Wt 44.9 kg (98 lb 14.4 oz)   LMP  (LMP Unknown)   SpO2 (!) 93%   Breastfeeding? No   BMI 19.32 kg/m²       Pain/Mike Score: Pain Assessment Performed: Yes (12/13/2017  5:00 AM)  Presence of Pain: denies (12/12/2017 11:00 PM)  Pain Rating Prior to Med Admin: 2 (12/13/2017  5:28 AM)  Pain Rating Post Med Admin: 0 (12/12/2017  5:52 PM)  Mike Score: 10 (12/12/2017 12:30 PM)

## 2017-12-14 PROBLEM — D62 ACUTE BLOOD LOSS ANEMIA: Status: ACTIVE | Noted: 2017-12-14

## 2017-12-14 LAB
ALBUMIN SERPL BCP-MCNC: 2.9 G/DL
ALP SERPL-CCNC: 65 U/L
ALT SERPL W/O P-5'-P-CCNC: 7 U/L
ANION GAP SERPL CALC-SCNC: 7 MMOL/L
AST SERPL-CCNC: 26 U/L
BASOPHILS # BLD AUTO: 0.01 K/UL
BASOPHILS NFR BLD: 0.1 %
BILIRUB SERPL-MCNC: 0.5 MG/DL
BUN SERPL-MCNC: 8 MG/DL
CALCIUM SERPL-MCNC: 8.5 MG/DL
CHLORIDE SERPL-SCNC: 97 MMOL/L
CO2 SERPL-SCNC: 29 MMOL/L
CREAT SERPL-MCNC: 0.6 MG/DL
DIFFERENTIAL METHOD: ABNORMAL
EOSINOPHIL # BLD AUTO: 0 K/UL
EOSINOPHIL NFR BLD: 0 %
ERYTHROCYTE [DISTWIDTH] IN BLOOD BY AUTOMATED COUNT: 13.8 %
EST. GFR  (AFRICAN AMERICAN): >60 ML/MIN/1.73 M^2
EST. GFR  (NON AFRICAN AMERICAN): >60 ML/MIN/1.73 M^2
GLUCOSE SERPL-MCNC: 116 MG/DL
HCT VFR BLD AUTO: 22.9 %
HGB BLD-MCNC: 7.8 G/DL
LYMPHOCYTES # BLD AUTO: 0.5 K/UL
LYMPHOCYTES NFR BLD: 3 %
MCH RBC QN AUTO: 30.4 PG
MCHC RBC AUTO-ENTMCNC: 34.1 G/DL
MCV RBC AUTO: 89 FL
MONOCYTES # BLD AUTO: 0.8 K/UL
MONOCYTES NFR BLD: 4.9 %
NEUTROPHILS # BLD AUTO: 14.2 K/UL
NEUTROPHILS NFR BLD: 92.5 %
PLATELET # BLD AUTO: 248 K/UL
PMV BLD AUTO: 11.6 FL
POTASSIUM SERPL-SCNC: 4 MMOL/L
PROT SERPL-MCNC: 6 G/DL
RBC # BLD AUTO: 2.57 M/UL
SODIUM SERPL-SCNC: 133 MMOL/L
WBC # BLD AUTO: 15.41 K/UL

## 2017-12-14 PROCEDURE — 80053 COMPREHEN METABOLIC PANEL: CPT

## 2017-12-14 PROCEDURE — 94640 AIRWAY INHALATION TREATMENT: CPT

## 2017-12-14 PROCEDURE — 27000190 HC CPAP FULL FACE MASK W/VALVE

## 2017-12-14 PROCEDURE — 25000003 PHARM REV CODE 250: Performed by: INTERNAL MEDICINE

## 2017-12-14 PROCEDURE — 25000242 PHARM REV CODE 250 ALT 637 W/ HCPCS: Performed by: EMERGENCY MEDICINE

## 2017-12-14 PROCEDURE — 85025 COMPLETE CBC W/AUTO DIFF WBC: CPT

## 2017-12-14 PROCEDURE — 63600175 PHARM REV CODE 636 W HCPCS: Performed by: INTERNAL MEDICINE

## 2017-12-14 PROCEDURE — 36415 COLL VENOUS BLD VENIPUNCTURE: CPT

## 2017-12-14 PROCEDURE — 94761 N-INVAS EAR/PLS OXIMETRY MLT: CPT

## 2017-12-14 PROCEDURE — 25000003 PHARM REV CODE 250: Performed by: EMERGENCY MEDICINE

## 2017-12-14 PROCEDURE — 99900035 HC TECH TIME PER 15 MIN (STAT)

## 2017-12-14 PROCEDURE — 94660 CPAP INITIATION&MGMT: CPT

## 2017-12-14 PROCEDURE — 25000242 PHARM REV CODE 250 ALT 637 W/ HCPCS: Performed by: INTERNAL MEDICINE

## 2017-12-14 PROCEDURE — 27000221 HC OXYGEN, UP TO 24 HOURS

## 2017-12-14 PROCEDURE — 12000002 HC ACUTE/MED SURGE SEMI-PRIVATE ROOM

## 2017-12-14 RX ORDER — PROMETHAZINE HYDROCHLORIDE 25 MG/1
25 TABLET ORAL EVERY 4 HOURS PRN
Status: DISCONTINUED | OUTPATIENT
Start: 2017-12-14 | End: 2017-12-16 | Stop reason: HOSPADM

## 2017-12-14 RX ORDER — ENOXAPARIN SODIUM 100 MG/ML
40 INJECTION SUBCUTANEOUS EVERY 24 HOURS
Status: DISCONTINUED | OUTPATIENT
Start: 2017-12-14 | End: 2017-12-16 | Stop reason: HOSPADM

## 2017-12-14 RX ORDER — FENTANYL 25 UG/1
1 PATCH TRANSDERMAL
Status: DISCONTINUED | OUTPATIENT
Start: 2017-12-14 | End: 2017-12-16 | Stop reason: HOSPADM

## 2017-12-14 RX ADMIN — GUAIFENESIN 1200 MG: 600 TABLET, EXTENDED RELEASE ORAL at 10:12

## 2017-12-14 RX ADMIN — IPRATROPIUM BROMIDE AND ALBUTEROL SULFATE 3 ML: .5; 3 SOLUTION RESPIRATORY (INHALATION) at 09:12

## 2017-12-14 RX ADMIN — ALUMINUM HYDROXIDE, MAGNESIUM HYDROXIDE, AND SIMETHICONE 30 ML: 200; 200; 20 SUSPENSION ORAL at 10:12

## 2017-12-14 RX ADMIN — POLYETHYLENE GLYCOL (3350) 17 G: 17 POWDER, FOR SOLUTION ORAL at 09:12

## 2017-12-14 RX ADMIN — TIOTROPIUM BROMIDE 18 MCG: 18 CAPSULE ORAL; RESPIRATORY (INHALATION) at 08:12

## 2017-12-14 RX ADMIN — GUAIFENESIN 1200 MG: 600 TABLET, EXTENDED RELEASE ORAL at 09:12

## 2017-12-14 RX ADMIN — OXYBUTYNIN CHLORIDE 5 MG: 5 TABLET ORAL at 10:12

## 2017-12-14 RX ADMIN — FLUTICASONE FUROATE AND VILANTEROL TRIFENATATE 1 PUFF: 200; 25 POWDER RESPIRATORY (INHALATION) at 08:12

## 2017-12-14 RX ADMIN — ALUMINUM HYDROXIDE, MAGNESIUM HYDROXIDE, AND SIMETHICONE 30 ML: 200; 200; 20 SUSPENSION ORAL at 12:12

## 2017-12-14 RX ADMIN — FLUOXETINE 20 MG: 20 CAPSULE ORAL at 09:12

## 2017-12-14 RX ADMIN — ARIPIPRAZOLE 15 MG: 5 TABLET ORAL at 10:12

## 2017-12-14 RX ADMIN — PANTOPRAZOLE SODIUM 40 MG: 40 TABLET, DELAYED RELEASE ORAL at 09:12

## 2017-12-14 RX ADMIN — DOCUSATE SODIUM AND SENNOSIDES 1 TABLET: 8.6; 5 TABLET, FILM COATED ORAL at 09:12

## 2017-12-14 RX ADMIN — ENOXAPARIN SODIUM 40 MG: 100 INJECTION SUBCUTANEOUS at 05:12

## 2017-12-14 RX ADMIN — OXYCODONE HYDROCHLORIDE 30 MG: 10 TABLET, FILM COATED, EXTENDED RELEASE ORAL at 09:12

## 2017-12-14 RX ADMIN — OXYCODONE HYDROCHLORIDE 30 MG: 10 TABLET, FILM COATED, EXTENDED RELEASE ORAL at 10:12

## 2017-12-14 RX ADMIN — TRAZODONE HYDROCHLORIDE 400 MG: 50 TABLET ORAL at 10:12

## 2017-12-14 RX ADMIN — FENTANYL 1 PATCH: 25 PATCH, EXTENDED RELEASE TRANSDERMAL at 09:12

## 2017-12-14 RX ADMIN — ALUMINUM HYDROXIDE, MAGNESIUM HYDROXIDE, AND SIMETHICONE 30 ML: 200; 200; 20 SUSPENSION ORAL at 05:12

## 2017-12-14 NOTE — PROGRESS NOTES
Followup received from Shilpi at [facility] regarding therapy offered by hospice.  Per Shilpi, their medical director, Dr. Cho,  feels that patient would benefit from SNF since she was independent prior to hip fx.  Stated that patient could receive SNF and hospice at [facility].  DAVID advised Shilpi that check would have to be done to determine if [facility] was in-network with patient's insurance.    DAVID will f/u.  Ally Colin LMSW, DAVE-DAVID, [facility]  12/14/2017

## 2017-12-14 NOTE — PROGRESS NOTES
Call received from Shilpi with [facility] who advised that Dr. Cho spoke with the family and that patient will return to hospice and hospice will provide therapy.  Ally Colin LMSw, ACADRIÁN-DAVID, CCM  12/14/2017

## 2017-12-14 NOTE — PROGRESS NOTES
Discussed with Hospitalist and therapist.  Patient's expressed a desire  to return to home with hospice per conversation.  Ally Colin LMSw, DAVE-Ev, CCM  12/14/2017

## 2017-12-14 NOTE — PLAN OF CARE
Problem: Fractured Hip (Adult)  Intervention: Prevent/Manage Fracture Bleeding  Patient surgical procedure of the right hip. Area clean, dry and intact no drainage noted. Patient able to extremity  Without any problem. Safety maintained, bed locked and in lowest position call light in reach will monitor.    12/14/17 3249   Safety Interventions   Bleeding Management affected area elevated;dressing monitored   Musculoskeletal Interventions   Fracture Immobilization supported with pillows;supported during position changes

## 2017-12-14 NOTE — SUBJECTIVE & OBJECTIVE
Interval History: s/p IM nailing and doing very well. Stable from breathing standpoint. Tolerated diet well    Review of Systems   Constitutional: Negative for activity change, appetite change, chills, diaphoresis and fever.   Eyes: Negative for discharge.   Respiratory: Negative for apnea, cough, choking, chest tightness, shortness of breath, wheezing and stridor.    Cardiovascular: Negative for chest pain, palpitations and leg swelling.   Gastrointestinal: Negative for abdominal pain, constipation, diarrhea, nausea and vomiting.   Genitourinary: Negative for difficulty urinating.   Musculoskeletal: Positive for arthralgias.   Psychiatric/Behavioral: Negative for agitation and behavioral problems.     Objective:     Vital Signs (Most Recent):  Temp: 98.9 °F (37.2 °C) (12/13/17 1700)  Pulse: 89 (12/13/17 1700)  Resp: 20 (12/13/17 1700)  BP: 116/61 (12/13/17 1700)  SpO2: 96 % (12/13/17 1700) Vital Signs (24h Range):  Temp:  [98.3 °F (36.8 °C)-99.8 °F (37.7 °C)] 98.9 °F (37.2 °C)  Pulse:  [] 89  Resp:  [16-20] 20  SpO2:  [91 %-100 %] 96 %  BP: ()/(50-71) 116/61     Weight: 44.9 kg (98 lb 15.8 oz)  Body mass index is 19.33 kg/m².  No intake or output data in the 24 hours ending 12/13/17 1821   Physical Exam   Constitutional: She appears well-developed and well-nourished.   HENT:   Head: Normocephalic and atraumatic.   Cardiovascular: Normal rate.  Exam reveals no friction rub.    Pulmonary/Chest: Effort normal and breath sounds normal. No respiratory distress. She has no wheezes. She has no rales. She exhibits no tenderness.   Abdominal: Soft. Bowel sounds are normal. There is no tenderness. There is no rebound and no guarding.   Musculoskeletal:   Bandage over incision site.    Neurological: She is alert.   Skin: Skin is warm and dry.   Psychiatric: She has a normal mood and affect. Her behavior is normal.   Vitals reviewed.      Significant Labs: All pertinent labs within the past 24 hours have been  reviewed.    Significant Imaging: I have reviewed all pertinent imaging results/findings within the past 24 hours.  I have reviewed and interpreted all pertinent imaging results/findings within the past 24 hours.

## 2017-12-14 NOTE — NURSING
Dr. Alonso notified of rapid response team initiation for acute AMS and tachycardia. New orders given.

## 2017-12-14 NOTE — PLAN OF CARE
Problem: Patient Care Overview  Goal: Plan of Care Review  Outcome: Ongoing (interventions implemented as appropriate)  Pt moved close to the nursing station due to increased confusion. Disoriented to place, time, and situation. Speech intelligible but incoherent. Rapid response team initiated for acute change in mental status and tachycardia 130's. ABG, EKG, labs, CPAP, and restraints ordered for removal of oxygen. Unable to sleep at all overnight. Surgical dressing dry and intact. Remains free from falls or trauma.

## 2017-12-14 NOTE — PROGRESS NOTES
SW attempted followup with patient's daughter, Harleen, at number on face sheet but number was not working.  DAVID spoke with patient's granddaughter, Marleny, to discuss charge plan.  Per granddaughter, plan is for patient to return to hospice upon discharge.  SW advised granddaughter of post op appt arranged for patient and advised that she would be notified when patient is ready for discharge.    Patient's post op surgery visit scheduled for 12/28/2017 at 9:30 a.m.  Information placed on the AVS.    Ally Colin LMSw, DAVE-David, [facility]  12/14/2017

## 2017-12-14 NOTE — PT/OT/SLP PROGRESS
Physical Therapy      Patient Name:  Rebecca Owen   MRN:  [id number]    Patient not seen today secondary to  (Pt with increased  BPM and /86.  Dr. Harris would like for therapy to hold off on pt for now.  Pt's nurse stated pt is having difficulty breathing and is confused, per supervising PT.). Will follow-up tomorrow if pt is appropriate.    Shruthi Conteh, PTA

## 2017-12-14 NOTE — PROGRESS NOTES
Ortho Daily Progress Note    Rebecca Owen is a 62 y.o. female admitted on 12/11/2017      Chief Complaint/Reason for admission: Fall (Pt's daughter accidently ran into pt, pt fell down, + LOC few seconds, Now AAOx4. Pt was on hospice until transfer today. 50 mg of Fentanyl given by EMS. Pt wears Fentanyl patch.  )       Hospital Day: 3  Post Op Day: 2 Days Post-Op     The patient was seen and examined this morning at the bedside. _______________    Vitals:    12/14/17 0544 12/14/17 0803 12/14/17 0822 12/14/17 1225   BP:  (!) 168/89  (!) 190/86   Pulse: (!) 116 (!) 115 (!) 115 (!) 121   Resp: (!) 28 18 20 17   Temp:  98.1 °F (36.7 °C)  97.8 °F (36.6 °C)   TempSrc:  Oral  Oral   SpO2: (!) 93% (!) 94% 95% (!) 92%   Weight:       Height:           Vital Signs (Most Recent)  Temp: 97.8 °F (36.6 °C) (12/14/17 1225)  Pulse: (!) 121 (12/14/17 1225)  Resp: 17 (12/14/17 1225)  BP: (!) 190/86 (12/14/17 1225)  SpO2: (!) 92 % (12/14/17 1225)    Vital Signs Range (Last 24H):  Temp:  [97.8 °F (36.6 °C)-101 °F (38.3 °C)]   Pulse:  []   Resp:  [17-30]   BP: (111-190)/(61-93)   SpO2:  [85 %-98 %]       Physical:    Disoriented this AM  Incision/ dressing clean/dry/intact  NVI Distally  Palpable distal pulses  CR<3sec      Recent Labs      12/14/17   0504   K  4.0   CALCIUM  8.5*       No intake/output data recorded.          Assessment:  A/P POD 2 s/p right           Hip IMN    Plan:    PT/OT  Pain Control  DVT Prophylaxis    Discharge Plan: likely SNF        Demetri Kiran MD  [facility]

## 2017-12-14 NOTE — ASSESSMENT & PLAN NOTE
Traumatic fracture. Underwent IM nailing without complications on 12/12. Pain well managed and stable from respiratory standpoint. DVT proph and PT. Appreciated further ortho recs.

## 2017-12-14 NOTE — PROGRESS NOTES
SW attempted f/u with patient at the bedside to discuss discharge plan.  Patient stated that she wanted everything to stay the same, but also appeared to be confused,e.g. mumbling and taking off her gown.  SW will f/u with patient's family.  Ally Colin LMSw, DAVE-Ev, [facility]  12/14/2017

## 2017-12-14 NOTE — PT/OT/SLP PROGRESS
Occupational Therapy      Patient Name:  Rebecca Owen   MRN:  [id number]    Patient not seen today secondary to Other (patient not appropriate for therapy at this time.  Per nursing, patient is having difficulty breathing, confused, and unable to follow commands.). Will follow-up as able.     JOMAR Black  12/14/2017

## 2017-12-14 NOTE — PROGRESS NOTES
[facility] Hospital Medicine  Progress Note    Patient Name: Rebecca Owen  MRN: [id number]  Patient Class: IP- Inpatient   Admission Date: 12/11/2017  Length of Stay: 2 days  Attending Physician: Odette Blount MD  Primary Care Provider: Asa Weinstein MD        Subjective:     Principal Problem:Closed fracture of right hip    HPI:  Mrs. Owen is a 63 yo F who presents to the ED with a CC or R hip pain. The patient notes that her daughter was having a seizure- she tried catching her and landed on her R hip. She presents to the ED and is found to have a R hip fracture. The patient has a known history of COPD and is on home 02.  She does not have any other complaints. No worsening SOB. No CP.  Ortho has been consulted and may take the patient to surgery later.     Hospital Course:  The patient was admitted to the hospital for evaluation and treatment of a R hip fracture. Ortho was consulted.     Interval History: s/p IM nailing and doing very well. Stable from breathing standpoint. Tolerated diet well    Review of Systems   Constitutional: Negative for activity change, appetite change, chills, diaphoresis and fever.   Eyes: Negative for discharge.   Respiratory: Negative for apnea, cough, choking, chest tightness, shortness of breath, wheezing and stridor.    Cardiovascular: Negative for chest pain, palpitations and leg swelling.   Gastrointestinal: Negative for abdominal pain, constipation, diarrhea, nausea and vomiting.   Genitourinary: Negative for difficulty urinating.   Musculoskeletal: Positive for arthralgias.   Psychiatric/Behavioral: Negative for agitation and behavioral problems.     Objective:     Vital Signs (Most Recent):  Temp: 98.9 °F (37.2 °C) (12/13/17 1700)  Pulse: 89 (12/13/17 1700)  Resp: 20 (12/13/17 1700)  BP: 116/61 (12/13/17 1700)  SpO2: 96 % (12/13/17 1700) Vital Signs (24h Range):  Temp:  [98.3 °F (36.8 °C)-99.8 °F (37.7 °C)] 98.9 °F (37.2 °C)  Pulse:   [] 89  Resp:  [16-20] 20  SpO2:  [91 %-100 %] 96 %  BP: ()/(50-71) 116/61     Weight: 44.9 kg (98 lb 15.8 oz)  Body mass index is 19.33 kg/m².  No intake or output data in the 24 hours ending 12/13/17 1821   Physical Exam   Constitutional: She appears well-developed and well-nourished.   HENT:   Head: Normocephalic and atraumatic.   Cardiovascular: Normal rate.  Exam reveals no friction rub.    Pulmonary/Chest: Effort normal and breath sounds normal. No respiratory distress. She has no wheezes. She has no rales. She exhibits no tenderness.   Abdominal: Soft. Bowel sounds are normal. There is no tenderness. There is no rebound and no guarding.   Musculoskeletal:   Bandage over incision site.    Neurological: She is alert.   Skin: Skin is warm and dry.   Psychiatric: She has a normal mood and affect. Her behavior is normal.   Vitals reviewed.      Significant Labs: All pertinent labs within the past 24 hours have been reviewed.    Significant Imaging: I have reviewed all pertinent imaging results/findings within the past 24 hours.  I have reviewed and interpreted all pertinent imaging results/findings within the past 24 hours.    Assessment/Plan:      * Closed fracture of right hip    Traumatic fracture. Underwent IM nailing without complications on 12/12. Pain well managed and stable from respiratory standpoint. DVT proph and PT. Appreciated further ortho recs.           Chronic respiratory failure with hypoxia    On Home 02. Still smokes.           DJD (degenerative joint disease)    No acute issues.           Anxiety    Resume home meds.           Borderline hypertension    Well controlled on BP meds.           Hypokalemia    Minimal. Will follow           Mild protein malnutrition    Will discuss with patient .          Bipolar 1 disorder    Resume home meds.          Tobacco abuse    Continues to smoke. Discussed with patient           GERD (gastroesophageal reflux disease)    Resume home meds.              VTE Risk Mitigation         Ordered     enoxaparin injection 30 mg  Daily     Route:  Subcutaneous        12/12/17 1357     Medium Risk of VTE  Once      12/11/17 1625     Place NOEMY hose  Until discontinued      12/11/17 1625          Tentative discharge home tomorrow with hospice, with PT if patient wishes to continue with this    Odette Harris MD  Department of Hospital Medicine   [facility]

## 2017-12-14 NOTE — NURSING
Wrist restraints removed. No bruising and pain noted. Patient confused calling for people that  Are not present. Patient requires max assist with ADLs transfers. Incontinent care provided. Safety maintained, bed locked and in lowest position call light in reach will monitor.

## 2017-12-15 LAB
ABO + RH BLD: NORMAL
BASOPHILS # BLD AUTO: 0.01 K/UL
BASOPHILS NFR BLD: 0.1 %
BLD GP AB SCN CELLS X3 SERPL QL: NORMAL
BLD PROD TYP BPU: NORMAL
BLOOD UNIT EXPIRATION DATE: NORMAL
BLOOD UNIT TYPE CODE: 5100
BLOOD UNIT TYPE: NORMAL
CODING SYSTEM: NORMAL
DIFFERENTIAL METHOD: ABNORMAL
DISPENSE STATUS: NORMAL
EOSINOPHIL # BLD AUTO: 0 K/UL
EOSINOPHIL NFR BLD: 0 %
ERYTHROCYTE [DISTWIDTH] IN BLOOD BY AUTOMATED COUNT: 13.9 %
HCT VFR BLD AUTO: 22.8 %
HGB BLD-MCNC: 7.6 G/DL
LYMPHOCYTES # BLD AUTO: 0.9 K/UL
LYMPHOCYTES NFR BLD: 6.6 %
MCH RBC QN AUTO: 29.8 PG
MCHC RBC AUTO-ENTMCNC: 33.3 G/DL
MCV RBC AUTO: 89 FL
MONOCYTES # BLD AUTO: 1 K/UL
MONOCYTES NFR BLD: 7.4 %
NEUTROPHILS # BLD AUTO: 11.9 K/UL
NEUTROPHILS NFR BLD: 85.9 %
PLATELET # BLD AUTO: 275 K/UL
PMV BLD AUTO: 11.2 FL
RBC # BLD AUTO: 2.55 M/UL
TRANS ERYTHROCYTES VOL PATIENT: NORMAL ML
WBC # BLD AUTO: 13.87 K/UL

## 2017-12-15 PROCEDURE — 97530 THERAPEUTIC ACTIVITIES: CPT

## 2017-12-15 PROCEDURE — 94640 AIRWAY INHALATION TREATMENT: CPT

## 2017-12-15 PROCEDURE — 12000002 HC ACUTE/MED SURGE SEMI-PRIVATE ROOM

## 2017-12-15 PROCEDURE — 86900 BLOOD TYPING SEROLOGIC ABO: CPT

## 2017-12-15 PROCEDURE — 85025 COMPLETE CBC W/AUTO DIFF WBC: CPT

## 2017-12-15 PROCEDURE — 27000221 HC OXYGEN, UP TO 24 HOURS

## 2017-12-15 PROCEDURE — 63600175 PHARM REV CODE 636 W HCPCS: Performed by: INTERNAL MEDICINE

## 2017-12-15 PROCEDURE — 36430 TRANSFUSION BLD/BLD COMPNT: CPT

## 2017-12-15 PROCEDURE — 86920 COMPATIBILITY TEST SPIN: CPT

## 2017-12-15 PROCEDURE — 94761 N-INVAS EAR/PLS OXIMETRY MLT: CPT

## 2017-12-15 PROCEDURE — 86901 BLOOD TYPING SEROLOGIC RH(D): CPT

## 2017-12-15 PROCEDURE — 25000003 PHARM REV CODE 250: Performed by: EMERGENCY MEDICINE

## 2017-12-15 PROCEDURE — 36415 COLL VENOUS BLD VENIPUNCTURE: CPT

## 2017-12-15 PROCEDURE — 99900035 HC TECH TIME PER 15 MIN (STAT)

## 2017-12-15 PROCEDURE — P9021 RED BLOOD CELLS UNIT: HCPCS

## 2017-12-15 RX ORDER — HYDROCODONE BITARTRATE AND ACETAMINOPHEN 500; 5 MG/1; MG/1
TABLET ORAL
Status: DISCONTINUED | OUTPATIENT
Start: 2017-12-15 | End: 2017-12-16 | Stop reason: HOSPADM

## 2017-12-15 RX ADMIN — OXYBUTYNIN CHLORIDE 5 MG: 5 TABLET ORAL at 09:12

## 2017-12-15 RX ADMIN — FLUOXETINE 20 MG: 20 CAPSULE ORAL at 09:12

## 2017-12-15 RX ADMIN — TIOTROPIUM BROMIDE 18 MCG: 18 CAPSULE ORAL; RESPIRATORY (INHALATION) at 08:12

## 2017-12-15 RX ADMIN — ALUMINUM HYDROXIDE, MAGNESIUM HYDROXIDE, AND SIMETHICONE 30 ML: 200; 200; 20 SUSPENSION ORAL at 04:12

## 2017-12-15 RX ADMIN — FLUTICASONE FUROATE AND VILANTEROL TRIFENATATE 1 PUFF: 200; 25 POWDER RESPIRATORY (INHALATION) at 08:12

## 2017-12-15 RX ADMIN — GUAIFENESIN 1200 MG: 600 TABLET, EXTENDED RELEASE ORAL at 09:12

## 2017-12-15 RX ADMIN — TRAZODONE HYDROCHLORIDE 400 MG: 50 TABLET ORAL at 09:12

## 2017-12-15 RX ADMIN — ALUMINUM HYDROXIDE, MAGNESIUM HYDROXIDE, AND SIMETHICONE 30 ML: 200; 200; 20 SUSPENSION ORAL at 05:12

## 2017-12-15 RX ADMIN — ARIPIPRAZOLE 15 MG: 5 TABLET ORAL at 09:12

## 2017-12-15 RX ADMIN — PANTOPRAZOLE SODIUM 40 MG: 40 TABLET, DELAYED RELEASE ORAL at 09:12

## 2017-12-15 RX ADMIN — OXYBUTYNIN CHLORIDE 5 MG: 5 TABLET ORAL at 02:12

## 2017-12-15 RX ADMIN — OXYCODONE HYDROCHLORIDE 30 MG: 10 TABLET, FILM COATED, EXTENDED RELEASE ORAL at 09:12

## 2017-12-15 RX ADMIN — POLYETHYLENE GLYCOL (3350) 17 G: 17 POWDER, FOR SOLUTION ORAL at 09:12

## 2017-12-15 RX ADMIN — ALBUTEROL SULFATE 2 PUFF: 90 AEROSOL, METERED RESPIRATORY (INHALATION) at 09:12

## 2017-12-15 RX ADMIN — OXYBUTYNIN CHLORIDE 5 MG: 5 TABLET ORAL at 05:12

## 2017-12-15 RX ADMIN — DOCUSATE SODIUM AND SENNOSIDES 1 TABLET: 8.6; 5 TABLET, FILM COATED ORAL at 09:12

## 2017-12-15 RX ADMIN — ENOXAPARIN SODIUM 40 MG: 100 INJECTION SUBCUTANEOUS at 04:12

## 2017-12-15 NOTE — PROGRESS NOTES
Pt in bed comfortable          Temp:  [97.5 °F (36.4 °C)-99.8 °F (37.7 °C)] 98.2 °F (36.8 °C)  Pulse:  [] 107  Resp:  [18-24] 18  SpO2:  [89 %-98 %] 96 %  BP: (110-153)/(71-97) 142/91        PE:    R thigh incisions clean and dry, moves R foot        Recent Labs  Lab 12/15/17  0542   WBC 13.87*   RBC 2.55*   HGB 7.6*   HCT 22.8*      MCV 89   MCH 29.8   MCHC 33.3         Current Facility-Administered Medications:     0.9%  NaCl infusion (for blood administration), , Intravenous, Q24H PRN, Odette Blount MD    acetaminophen tablet 650 mg, 650 mg, Oral, Q6H PRN, Terrence Alonso MD, 650 mg at 12/13/17 2124    albuterol inhaler 2 puff, 2 puff, Inhalation, Q6H PRN, Ham Matias Jr., MD, 2 puff at 12/13/17 0738    albuterol-ipratropium 2.5mg-0.5mg/3mL nebulizer solution 3 mL, 3 mL, Nebulization, Q6H PRN, Ham Matias Jr., MD, 3 mL at 12/14/17 2135    aluminum-magnesium hydroxide-simethicone 200-200-20 mg/5 mL suspension 30 mL, 30 mL, Oral, QID (AC & HS), Ham Matias Jr., MD, 30 mL at 12/15/17 1612    ARIPiprazole tablet 15 mg, 15 mg, Oral, Nightly, Ham Matias Jr., MD, 15 mg at 12/14/17 2231    benzonatate capsule 100 mg, 100 mg, Oral, TID PRN, Ham Matias Jr., MD    cyclobenzaprine tablet 10 mg, 10 mg, Oral, TID PRN, Odette Blount MD    enoxaparin injection 40 mg, 40 mg, Subcutaneous, Daily, Odette Blount MD, 40 mg at 12/15/17 1612    fentaNYL 25 mcg/hr 1 patch, 1 patch, Transdermal, Q72H, Odette Blount MD, 1 patch at 12/14/17 0935    FLUoxetine capsule 20 mg, 20 mg, Oral, Daily, Ham Matias Jr., MD, 20 mg at 12/15/17 0955    fluticasone-vilanterol 200-25 mcg/dose diskus inhaler 1 puff, 1 puff, Inhalation, Daily, Ham Matias Jr., MD, 1 puff at 12/15/17 0826    guaiFENesin 12 hr tablet 1,200 mg, 1,200 mg, Oral, BID, Ham Matias Jr., MD, 1,200 mg at 12/15/17 0955    LORazepam tablet 1 mg, 1 mg, Oral, Q8H PRN, Ham IRVIN  Polly Gilbert MD, 1 mg at 12/13/17 2343    oxybutynin tablet 5 mg, 5 mg, Oral, TID, Ham Matias Jr., MD, 5 mg at 12/15/17 1437    oxyCODONE 12 hr tablet 30 mg, 30 mg, Oral, Q12H, Ham Matias Jr., MD, 30 mg at 12/15/17 0955    oxyCODONE immediate release tablet 5 mg, 5 mg, Oral, Q4H PRN, Papito Art III, MD, 5 mg at 12/13/17 1840    pantoprazole EC tablet 40 mg, 40 mg, Oral, Daily, Ham Matias Jr., MD, 40 mg at 12/15/17 0955    polyethylene glycol packet 17 g, 17 g, Oral, Daily, Ham Matias Jr., MD, 17 g at 12/15/17 0955    promethazine tablet 25 mg, 25 mg, Oral, Q4H PRN, Odette Blount MD    senna-docusate 8.6-50 mg per tablet 1 tablet, 1 tablet, Oral, Daily, Ham Matias Jr., MD, 1 tablet at 12/15/17 0955    tiotropium inhalation capsule 18 mcg, 18 mcg, Inhalation, Daily, Odette Blount MD, 18 mcg at 12/15/17 0827    traZODone tablet 400 mg, 400 mg, Oral, QHS, Ham Matias Jr., MD, 400 mg at 12/14/17 4936      A/P:    R hip IT fx, rev oblique, s/p IM nailing 12/12     COPD on O2    Anemia - transfusion done    PT - WBAT    Dressing change     Placement - SNF vs hospice     dvt proph - lovenox, NOEMYs

## 2017-12-15 NOTE — NURSING
"Spoken with patient's daughter Marleny in regards to  Possible transfusion. She stated, I'll be in to visit with my grandma in a few minutes."  Patient currently resting in bed with eyes open still confusion but not as much as yesterday. Patient took Am medication without any problem. Safety maintained. Bed locked and in lowest position with call light in reach will monitor.   "

## 2017-12-15 NOTE — SUBJECTIVE & OBJECTIVE
Interval History: agitated overnight. Placed on CPAP temporarily. SOB very easily. Better this AM but still with some disorientation and hallucination.     Review of Systems   Constitutional: Negative for appetite change, chills, diaphoresis and fever.   Eyes: Negative for discharge.   Respiratory: Negative for apnea, cough, choking, chest tightness, shortness of breath, wheezing and stridor.    Cardiovascular: Negative for chest pain, palpitations and leg swelling.   Gastrointestinal: Negative for abdominal pain, constipation, diarrhea, nausea and vomiting.   Genitourinary: Negative for difficulty urinating.   Musculoskeletal: Positive for arthralgias.   Psychiatric/Behavioral: Positive for confusion and hallucinations. Negative for agitation and behavioral problems.     Objective:     Vital Signs (Most Recent):  Temp: 99.8 °F (37.7 °C) (12/14/17 1922)  Pulse: (!) 119 (12/14/17 1922)  Resp: (!) 21 (12/14/17 1922)  BP: (!) 153/97 (12/14/17 1922)  SpO2: 96 % (12/14/17 1922) Vital Signs (24h Range):  Temp:  [97.8 °F (36.6 °C)-101 °F (38.3 °C)] 99.8 °F (37.7 °C)  Pulse:  [102-135] 119  Resp:  [17-30] 21  SpO2:  [92 %-98 %] 96 %  BP: (126-190)/(72-97) 153/97     Weight: 44.9 kg (98 lb 15.8 oz)  Body mass index is 19.33 kg/m².  No intake or output data in the 24 hours ending 12/14/17 1928   Physical Exam   Constitutional: She appears well-developed and well-nourished.   HENT:   Head: Normocephalic and atraumatic.   Cardiovascular: Normal rate.  Exam reveals no friction rub.    Pulmonary/Chest: Effort normal and breath sounds normal. No respiratory distress. She has no wheezes. She has no rales. She exhibits no tenderness.   Abdominal: Soft. Bowel sounds are normal. There is no tenderness. There is no rebound and no guarding.   Musculoskeletal:   Bandage over incision site.    Neurological: She is alert.   Disoriented to time. Mild visual hallucination but easily redirectable   Skin: Skin is warm and dry.   Psychiatric:  She has a normal mood and affect. Her behavior is normal.   Vitals reviewed.      Significant Labs: All pertinent labs within the past 24 hours have been reviewed.    Significant Imaging: I have reviewed all pertinent imaging results/findings within the past 24 hours.  I have reviewed and interpreted all pertinent imaging results/findings within the past 24 hours.

## 2017-12-15 NOTE — ASSESSMENT & PLAN NOTE
Traumatic fracture. Underwent IM nailing without complications on 12/12. Pain well managed and stable from respiratory standpoint. DVT proph and PT. Appreciated further ortho recs.     Patient will not tolerate intensive PT either at a SNF or rehab facility given advanced COPD. Gets hypoxic very easily and I believe this would only cause more harm. Patient and family agree that conservative therapy (transfers and ambulation as tolerated) while in hospice is preferred, especially when patient wishes to continue smoking. Will not pursue aggressive therapy, but rather as tolerated.

## 2017-12-15 NOTE — ASSESSMENT & PLAN NOTE
Resume home meds. Had hallucinations overnight. Better now but still present. Easily redirectable. Now next to window. Delirium prevention protocol.

## 2017-12-15 NOTE — NURSING
Patient had blood infused, tolerated without any problem. No adverse reactions noted. Patient currently resting in bed with eyes open. No acute distress noted. Safety maintained, bed remains locked and in lowest position with call light in reach will monitor.

## 2017-12-15 NOTE — PT/OT/SLP PROGRESS
Occupational Therapy      Patient Name:  Rebecca Owen   MRN:  [id number]    Patient not seen today secondary to Other (patient receiving blood transfusion; nurse at bedside). Will follow-up as able.    JOMAR Black  12/15/2017

## 2017-12-15 NOTE — PLAN OF CARE
Problem: Physical Therapy Goal  Goal: Physical Therapy Goal  Goals to be met by: 17    Patient will increase functional independence with mobility by performin. Supine to sit with supervision  2. Sit to stand transfer with supervision  3. Bed to chair transfer with supervision using Rolling Walker  4. Gait x50 feet with supervision using Rolling Walker  5. Lower extremity exercise program x20 reps per handout, with supervision     Outcome: Ongoing (interventions implemented as appropriate)  Pt transferred EOB>Bschair with HR increasing to 124BPM and O2 sats dropping to 91% requiring cues for pursed lip breathing technique.

## 2017-12-15 NOTE — PT/OT/SLP PROGRESS
"Physical Therapy Treatment    Patient Name:  Rebecca Owen   MRN:  [id number]    Recommendations:     Discharge Recommendations:  home health PT   Discharge Equipment Recommendations: none   Barriers to discharge: pt on hospice prior to admit to hospital    Assessment:     Rebecca Owen is a 62 y.o. female admitted with a medical diagnosis of Closed fracture of right hip.  She presents with the following impairments/functional limitations:  weakness, impaired self care skills, impaired functional mobilty, impaired cognition, decreased safety awareness, impaired coordination, pain, decreased coordination, gait instability, impaired endurance, impaired balance, decreased lower extremity function, decreased ROM, edema, orthopedic precautions, impaired skin.  Pt continues to have decreased cognition displaying increased confusion at times.  Pt is seeing people who are not present in room and unable to correctly answer questions.  Pt's HR increased to ~124BPM with transfer from bed>BSchair.  Pt with increased complaints of SOB with transfer, however, O2 sats ~91% with pursed lip breathing increased to 93%.    Rehab Prognosis:  good; patient would benefit from acute skilled PT services to address these deficits and reach maximum level of function.      Recent Surgery: Procedure(s) (LRB):  IM NAILING OF HIP (Right) 3 Days Post-Op    Plan:     During this hospitalization, patient to be seen daily to address the above listed problems via gait training, therapeutic activities, therapeutic exercises  · Plan of Care Expires:  12/20/17   Plan of Care Reviewed with: patient    Subjective     Communicated with pt's nurse, Krissy, prior to session.  Patient found supine in bed talking to people in the room upon PT entry to room, agreeable to treatment.      Chief Complaint: R hip pain  Patient comments/goals: Pt states " Why wont they give me oxygen."  Pain/Comfort:  · Pain Rating 1:  (yes but did not " rate)  · Location - Side 1: Right  · Location 1: leg  · Pain Addressed 1: Nurse notified, Distraction, Reposition    Patients cultural, spiritual, Sikhism conflicts given the current situation:      Objective:     Patient found with: peripheral IV, oxygen     General Precautions: Standard, fall, respiratory   Orthopedic Precautions:RLE weight bearing as tolerated   Braces: N/A     Functional Mobility:  · Bed Mobility:     · Scooting: contact guard assistance  · Supine to Sit: contact guard assistance (Performed twice 2/2 pt feeling as if she was going to passout sitting EOB.  Pt returned self to supine)  · Sit to Supine: did not perform  · Transfers:     · Sit to Stand:  contact guard assistance with no AD  · Bed to Chair: minimum assistance with  no AD  using  Stand Pivot  · Gait: Pt took ~3 steps with HHA, Min A from EOB>BSchair  · Balance: Pt able to sit EOB with CGA and static standing/dynamic standing with min A      AM-PAC 6 CLICK MOBILITY  Turning over in bed (including adjusting bedclothes, sheets and blankets)?: 3  Sitting down on and standing up from a chair with arms (e.g., wheelchair, bedside commode, etc.): 3  Moving from lying on back to sitting on the side of the bed?: 3  Moving to and from a bed to a chair (including a wheelchair)?: 3  Need to walk in hospital room?: 3  Climbing 3-5 steps with a railing?: 2  Total Score: 17       Therapeutic Activities and Exercises:  Pt sitting EOB feeling as if she was going to pass out, so returned self to supine.  Upon second trial with supine>sit and sitting EOB pt with no complaints of feeling like she was going to pass out.  Pt's family members were in room.  Pt performed transfers with min A from EOB>BSchair with HR increasing to 124BPM and O2 sats dropping to 91%.  Pt required pursed lip breathing and strong encouragement to calm down.       Patient left reclined in BSchair with pressure relief cushion and B heels elevated with all lines intact, call  button in reach, pt's nurseKrissy notified and family members present..    GOALS:    Physical Therapy Goals        Problem: Physical Therapy Goal    Goal Priority Disciplines Outcome Goal Variances Interventions   Physical Therapy Goal     PT/OT, PT      Description:  Goals to be met by: 17    Patient will increase functional independence with mobility by performin. Supine to sit with supervision  2. Sit to stand transfer with supervision  3. Bed to chair transfer with supervision using Rolling Walker  4. Gait x50 feet with supervision using Rolling Walker  5. Lower extremity exercise program x20 reps per handout, with supervision                      Time Tracking:     PT Received On: 12/15/17  PT Start Time: 1025     PT Stop Time: 1051  PT Total Time (min): 26 min     Billable Minutes: Therapeutic Activity 26    Treatment Type: Treatment  PT/PTA: PTA     PTA Visit Number: 1     Shruthi Conteh PTA  12/15/2017

## 2017-12-15 NOTE — PROGRESS NOTES
[facility] Hospital Medicine  Progress Note    Patient Name: Rebecca Owen  MRN: [id number]  Patient Class: IP- Inpatient   Admission Date: 12/11/2017  Length of Stay: 3 days  Attending Physician: Odette Blount MD  Primary Care Provider: Asa Weinstein MD        Subjective:     Principal Problem:Closed fracture of right hip    HPI:  Mrs. Owen is a 63 yo F who presents to the ED with a CC or R hip pain. The patient notes that her daughter was having a seizure- she tried catching her and landed on her R hip. She presents to the ED and is found to have a R hip fracture. The patient has a known history of COPD and is on home 02.  She does not have any other complaints. No worsening SOB. No CP.  Ortho has been consulted and may take the patient to surgery later.     Hospital Course:  The patient was admitted to the hospital for evaluation and treatment of a traumatic R hip fracture. Imaging revealed acute comminuted intratrochanteric fracture of right hip. Ortho consulted. Patient is DNR and currently under hospice care, however, intervention will offer benefit, comfort and improved quality of life as she is not actively dying. Underwent IM nailing of right hip on 12/12. No complications or significant blood loss. Remained stable respiratory wise post surgery. Tolerated diet, DVT prophylaxis and physical therapy. Had one episode of hyperactive delirium overnight 12/13-12/14. This eventually resolved and was placed on CPAP for more oxygen support in the meantime.     Interval History: agitated overnight. Placed on CPAP temporarily. SOB very easily. Better this AM but still with some disorientation and hallucination.     Review of Systems   Constitutional: Negative for appetite change, chills, diaphoresis and fever.   Eyes: Negative for discharge.   Respiratory: Negative for apnea, cough, choking, chest tightness, shortness of breath, wheezing and stridor.    Cardiovascular: Negative  for chest pain, palpitations and leg swelling.   Gastrointestinal: Negative for abdominal pain, constipation, diarrhea, nausea and vomiting.   Genitourinary: Negative for difficulty urinating.   Musculoskeletal: Positive for arthralgias.   Psychiatric/Behavioral: Positive for confusion and hallucinations. Negative for agitation and behavioral problems.     Objective:     Vital Signs (Most Recent):  Temp: 99.8 °F (37.7 °C) (12/14/17 1922)  Pulse: (!) 119 (12/14/17 1922)  Resp: (!) 21 (12/14/17 1922)  BP: (!) 153/97 (12/14/17 1922)  SpO2: 96 % (12/14/17 1922) Vital Signs (24h Range):  Temp:  [97.8 °F (36.6 °C)-101 °F (38.3 °C)] 99.8 °F (37.7 °C)  Pulse:  [102-135] 119  Resp:  [17-30] 21  SpO2:  [92 %-98 %] 96 %  BP: (126-190)/(72-97) 153/97     Weight: 44.9 kg (98 lb 15.8 oz)  Body mass index is 19.33 kg/m².  No intake or output data in the 24 hours ending 12/14/17 1928   Physical Exam   Constitutional: She appears well-developed and well-nourished.   HENT:   Head: Normocephalic and atraumatic.   Cardiovascular: Normal rate.  Exam reveals no friction rub.    Pulmonary/Chest: Effort normal and breath sounds normal. No respiratory distress. She has no wheezes. She has no rales. She exhibits no tenderness.   Abdominal: Soft. Bowel sounds are normal. There is no tenderness. There is no rebound and no guarding.   Musculoskeletal:   Bandage over incision site.    Neurological: She is alert.   Disoriented to time. Mild visual hallucination but easily redirectable   Skin: Skin is warm and dry.   Psychiatric: She has a normal mood and affect. Her behavior is normal.   Vitals reviewed.      Significant Labs: All pertinent labs within the past 24 hours have been reviewed.    Significant Imaging: I have reviewed all pertinent imaging results/findings within the past 24 hours.  I have reviewed and interpreted all pertinent imaging results/findings within the past 24 hours.    Assessment/Plan:      * Closed fracture of right hip     Traumatic fracture. Underwent IM nailing without complications on 12/12. Pain well managed and stable from respiratory standpoint. DVT proph and PT. Appreciated further ortho recs.     Patient will not tolerate intensive PT either at a SNF or rehab facility given advanced COPD. Gets hypoxic very easily and I believe this would only cause more harm. Patient and family agree that conservative therapy (transfers and ambulation as tolerated) while in hospice is preferred, especially when patient wishes to continue smoking. Will not pursue aggressive therapy, but rather as tolerated.            Acute blood loss anemia    As expected post surgery, in setting of anemia of chronic disease. No physical or clinical signs of active bleeding. Will recheck CBC in AM          Chronic respiratory failure with hypoxia    On Home 02. Still smokes.           DJD (degenerative joint disease)    No acute issues.           Anxiety    Resume home meds.           Borderline hypertension    Well controlled on BP meds.           Hypokalemia    Minimal. Will follow           Mild protein malnutrition    Will discuss with patient .          Bipolar 1 disorder    Resume home meds. Had hallucinations overnight. Better now but still present. Easily redirectable. Now next to window. Delirium prevention protocol.           Tobacco abuse    Continues to smoke. Discussed with patient. Not motivated to quit           GERD (gastroesophageal reflux disease)    Resume home meds.             VTE Risk Mitigation         Ordered     enoxaparin injection 40 mg  Daily     Route:  Subcutaneous        12/14/17 0956     Medium Risk of VTE  Once      12/11/17 1625     Place NOEMY hose  Until discontinued      12/11/17 1625        CBC in AM. Tentative discharge back to home hospice tomorrow. Discussed with family.       Odette Harris MD  Department of Hospital Medicine   [facility]

## 2017-12-15 NOTE — PLAN OF CARE
Problem: Patient Care Overview  Goal: Plan of Care Review  Outcome: Ongoing (interventions implemented as appropriate)  Confusion/delirium ongoing although it does wax and wane. Complaint of pain adequately relieved with scheduled medication. Bed alarm armed and audible. Remains free from falls or trauma.

## 2017-12-15 NOTE — ASSESSMENT & PLAN NOTE
As expected post surgery, in setting of anemia of chronic disease. No physical or clinical signs of active bleeding. Will recheck CBC in AM

## 2017-12-16 VITALS
SYSTOLIC BLOOD PRESSURE: 133 MMHG | BODY MASS INDEX: 19.44 KG/M2 | WEIGHT: 99 LBS | DIASTOLIC BLOOD PRESSURE: 86 MMHG | HEART RATE: 107 BPM | RESPIRATION RATE: 20 BRPM | OXYGEN SATURATION: 95 % | TEMPERATURE: 98 F | HEIGHT: 60 IN

## 2017-12-16 PROCEDURE — 25000003 PHARM REV CODE 250: Performed by: EMERGENCY MEDICINE

## 2017-12-16 PROCEDURE — 25000003 PHARM REV CODE 250: Performed by: ORTHOPAEDIC SURGERY

## 2017-12-16 PROCEDURE — 27000221 HC OXYGEN, UP TO 24 HOURS

## 2017-12-16 PROCEDURE — 94640 AIRWAY INHALATION TREATMENT: CPT

## 2017-12-16 RX ORDER — MAG HYDROX/ALUMINUM HYD/SIMETH 200-200-20
30 SUSPENSION, ORAL (FINAL DOSE FORM) ORAL EVERY 6 HOURS PRN
COMMUNITY
Start: 2017-12-16

## 2017-12-16 RX ORDER — ASPIRIN 81 MG/1
81 TABLET ORAL DAILY
Refills: 0 | COMMUNITY
Start: 2017-12-16 | End: 2018-12-16

## 2017-12-16 RX ADMIN — FLUTICASONE FUROATE AND VILANTEROL TRIFENATATE 1 PUFF: 200; 25 POWDER RESPIRATORY (INHALATION) at 10:12

## 2017-12-16 RX ADMIN — FLUOXETINE 20 MG: 20 CAPSULE ORAL at 08:12

## 2017-12-16 RX ADMIN — DOCUSATE SODIUM AND SENNOSIDES 1 TABLET: 8.6; 5 TABLET, FILM COATED ORAL at 08:12

## 2017-12-16 RX ADMIN — TIOTROPIUM BROMIDE 18 MCG: 18 CAPSULE ORAL; RESPIRATORY (INHALATION) at 10:12

## 2017-12-16 RX ADMIN — OXYCODONE HYDROCHLORIDE 30 MG: 10 TABLET, FILM COATED, EXTENDED RELEASE ORAL at 08:12

## 2017-12-16 RX ADMIN — OXYCODONE HYDROCHLORIDE 5 MG: 5 TABLET ORAL at 05:12

## 2017-12-16 RX ADMIN — PANTOPRAZOLE SODIUM 40 MG: 40 TABLET, DELAYED RELEASE ORAL at 08:12

## 2017-12-16 RX ADMIN — GUAIFENESIN 1200 MG: 600 TABLET, EXTENDED RELEASE ORAL at 08:12

## 2017-12-16 RX ADMIN — OXYBUTYNIN CHLORIDE 5 MG: 5 TABLET ORAL at 05:12

## 2017-12-16 NOTE — PLAN OF CARE
12/16/17 1000   Medicare Message   Important Message from Medicare regarding Discharge Appeal Rights Other (comments)   Unable to reach family to discuss

## 2017-12-16 NOTE — DISCHARGE SUMMARY
[facility] Hospital Medicine  Discharge Summary      Patient Name: Rebecca Owen  MRN: [id number]  Admission Date: 12/11/2017  Hospital Length of Stay: 5 days  Discharge Date and Time:  12/16/2017 9:19 AM  Attending Physician: Odette Blount MD   Discharging Provider: Odette Harris MD  Primary Care Provider: Asa Weinstein MD      HPI:   Mrs. Owen is a 61 yo F who presents to the ED with a CC or R hip pain. The patient notes that her daughter was having a seizure- she tried catching her and landed on her R hip. She presents to the ED and is found to have a R hip fracture. The patient has a known history of COPD and is on home 02.  She does not have any other complaints. No worsening SOB. No CP.  Ortho has been consulted and may take the patient to surgery later.     Procedure(s) (LRB):  IM NAILING OF HIP (Right)      Hospital Course:   Ms Owen presented with traumatic R hip fracture. Imaging revealed acute comminuted intratrochanteric fracture of right hip. Ortho consulted. Patient is DNR and currently under hospice care, however, patient is not actively dying and intervention for fracture would offer benefit, comfort and improved quality of life. Underwent IM nailing of right hip on 12/12. No complications or significant blood loss. Remained stable respiratory wise post surgery. Tolerated diet, DVT prophylaxis and physical therapy but with minimal activity as per patient's request and tolerance. Had one episode of hyperactive delirium overnight 12/13-12/14. This eventually resolved and was placed on CPAP for more oxygen support in the meantime. Also with blood loss post op, which is expected, especially with underlying anemia of chronic disease. One unit of blood transfused. Patient overall improved with this. After discussion with patient, patient's daughter and physical therapy, aggressive rehab services in either rehab facility or SNF were not pursued. Patient's underlying  COPD is a limiting factor for aggressive therapy. Patient's preference is to proceed with minimal activity as tolerated. Conservative aspirin for DVT prophylaxis. F/u with ortho in 2 weeks. Regular diet.      Consults:   Consults         Status Ordering Provider     Inpatient consult to Orthopedic Surgery  Once     Provider:  Papito Art III, MD    Completed CHEMA JOSE JR     Inpatient consult to Social Work  Once     Provider:  (Not yet assigned)    Acknowledged CHEMA JOSE JR          No new Assessment & Plan notes have been filed under this hospital service since the last note was generated.  Service: Hospital Medicine    Final Active Diagnoses:    Diagnosis Date Noted POA    PRINCIPAL PROBLEM:  Closed fracture of right hip [S72.001A] 12/11/2017 Yes    Acute blood loss anemia [D62] 12/14/2017 No    Chronic respiratory failure with hypoxia [J96.11] 02/15/2016 Yes    Anxiety [F41.9] 09/06/2015 Yes    Hypokalemia [E87.6] 09/06/2015 Yes    Borderline hypertension [R03.0] 09/06/2015 Yes    DJD (degenerative joint disease) [M19.90] 09/06/2015 Yes    Mild protein malnutrition [E44.1] 06/10/2015 Yes    Bipolar 1 disorder [F31.9] 04/26/2014 Yes    Tobacco abuse [Z72.0] 03/01/2013 Yes     Chronic    GERD (gastroesophageal reflux disease) [K21.9] 10/18/2012 Yes     Chronic      Problems Resolved During this Admission:    Diagnosis Date Noted Date Resolved POA    Current every day smoker [F17.200] 04/26/2014 06/10/2015 Yes     Chronic    Leukocytosis [D72.829] 04/26/2014 06/10/2015 Yes    Essential hypertension [I10] 06/25/2013 12/11/2017 Yes     Chronic       Discharged Condition: good    Disposition: Home or Self Care    Follow Up:  Follow-up Information     Papito Art III, MD On 12/28/2017.    Specialty:  Orthopedic Surgery  Why:  9:30 a.m. on December 28, 2017 see Dr. Art for your post op followup visit.  Contact information:  [address] [facility] [address]  [place]  [address]  [phone]             [facility].    Specialty:  Hospice Services  Why:  Hospice  Contact information:  [address] [place] [address] [phone]                 Medications:  Reconciled Home Medications:   Current Discharge Medication List      START taking these medications    Details   aspirin (ECOTRIN) 81 MG EC tablet Take 1 tablet (81 mg total) by mouth once daily.  Refills: 0         CONTINUE these medications which have CHANGED    Details   aluminum-magnesium hydroxide-simethicone (MAALOX) 200-200-20 mg/5 mL Susp Take 30 mLs by mouth every 6 (six) hours as needed. For indigestion      bisacodyl (FLEET) 10 mg/30 mL Enem Place 30 mLs (10 mg total) rectally daily as needed. As needed for constipation  Refills: 0         CONTINUE these medications which have NOT CHANGED    Details   albuterol 90 mcg/actuation inhaler Inhale 2 puffs into the lungs every 6 (six) hours as needed for Wheezing.  Qty: 18 g, Refills: 6      albuterol-ipratropium 2.5mg-0.5mg/3mL (DUO-NEB) 0.5 mg-3 mg(2.5 mg base)/3 mL nebulizer solution Take 3 mLs by nebulization every 6 (six) hours as needed for Wheezing.  Qty: 3 mL, Refills: 3      aripiprazole (ABILIFY) 30 MG Tab Take 15 mg by mouth nightly.       benzonatate (TESSALON) 100 MG capsule Take 100 mg by mouth 3 (three) times daily as needed for Cough.      cyclobenzaprine (FLEXERIL) 10 MG tablet Take 10 mg by mouth 3 (three) times daily as needed for Muscle spasms.      fentaNYL (DURAGESIC) 25 mcg/hr Place 1 patch onto the skin every 72 hours.      fluoxetine (PROZAC) 40 MG capsule Take 20 mg by mouth once daily.       fluticasone-salmeterol 500-50 mcg/dose (ADVAIR DISKUS) 500-50 mcg/dose DsDv diskus inhaler Inhale 1 puff into the lungs 2 (two) times daily. Rinse mouth after each use.  Qty: 1 Inhaler, Refills: 6    Associated Diagnoses: Chronic airway obstruction, not elsewhere classified      guaiFENesin (MUCINEX) 600 mg 12 hr tablet Take 1,200 mg by  mouth 2 (two) times daily as needed for congestion.      lorazepam (ATIVAN) 1 MG tablet Take 1 mg by mouth 2 (two) times daily as needed.      meloxicam (MOBIC) 15 MG tablet Take 15 mg by mouth once daily.      omeprazole (PRILOSEC) 20 MG capsule Take 20 mg by mouth once daily.      oxybutynin (DITROPAN) 5 MG Tab Take 5 mg by mouth 3 (three) times daily.      oxyCODONE (OXYCONTIN) 30 mg TR12 12 hr tablet Take 30 mg by mouth every 12 (twelve) hours.      OXYGEN-AIR DELIVERY SYSTEMS MISC by [facility].(Non-Drug; Combo Route) route. Uses O2 via N/C  At 2 liters at home when neededd      polyethylene glycol (GLYCOLAX) 17 gram PwPk Take by mouth daily as needed for constipation.      PROCHLORPERAZINE MALEATE ORAL Take by mouth 3 times as needed for nausea and/or vomiting      promethazine (PHENERGAN) 25 MG tablet Take 25 mg by mouth every 4 (four) hours as needed for nausea and/or vomiting.      senna-docusate 8.6-50 mg (SENNA WITH DOCUSATE SODIUM) 8.6-50 mg per tablet Take 1 tablet by mouth once daily.      temazepam (RESTORIL) 30 mg capsule Take 30 mg by mouth nightly as needed for Insomnia.      tiotropium (SPIRIVA WITH HANDIHALER) 18 mcg inhalation capsule Inhale 18 mcg into the lungs once daily.      trazodone (DESYREL) 300 MG tablet Take 400 mg by mouth every evening.        Indwelling Lines/Drains at time of discharge:   Lines/Drains/Airways          No matching active lines, drains, or airways          Time spent on the discharge of patient: > 45 minutes  Patient was seen and examined on the date of discharge and determined to be suitable for discharge.         Odette Harris MD  Department of Hospital Medicine  [facility]

## 2017-12-16 NOTE — SUBJECTIVE & OBJECTIVE
Interval History: not as agitated but still confused. With anemia that is symptomatic. Family in agreement with transfusion.     Review of Systems   Constitutional: Negative for appetite change, chills, diaphoresis and fever.   Eyes: Negative for discharge.   Respiratory: Negative for apnea, cough, choking, chest tightness, shortness of breath, wheezing and stridor.    Cardiovascular: Negative for chest pain, palpitations and leg swelling.   Gastrointestinal: Negative for abdominal pain, constipation, diarrhea, nausea and vomiting.   Genitourinary: Negative for difficulty urinating.   Musculoskeletal: Positive for arthralgias.   Psychiatric/Behavioral: Positive for confusion and hallucinations. Negative for agitation and behavioral problems.     Objective:     Vital Signs (Most Recent):  Temp: 98.9 °F (37.2 °C) (12/16/17 0455)  Pulse: 110 (12/16/17 0455)  Resp: (!) 21 (12/16/17 0455)  BP: (!) 140/90 (12/16/17 0455)  SpO2: (!) 94 % (12/16/17 0455) Vital Signs (24h Range):  Temp:  [97.5 °F (36.4 °C)-99.7 °F (37.6 °C)] 98.9 °F (37.2 °C)  Pulse:  [] 110  Resp:  [16-21] 21  SpO2:  [93 %-98 %] 94 %  BP: (110-151)/(71-94) 140/90     Weight: 44.9 kg (98 lb 15.8 oz)  Body mass index is 19.33 kg/m².    Intake/Output Summary (Last 24 hours) at 12/16/17 0701  Last data filed at 12/16/17 0600   Gross per 24 hour   Intake             1202 ml   Output                0 ml   Net             1202 ml      Physical Exam   Constitutional: She appears well-developed and well-nourished.   HENT:   Head: Normocephalic and atraumatic.   Cardiovascular: Normal rate.  Exam reveals no friction rub.    Pulmonary/Chest: Effort normal and breath sounds normal. No respiratory distress. She has no wheezes. She has no rales. She exhibits no tenderness.   Abdominal: Soft. Bowel sounds are normal. There is no tenderness. There is no rebound and no guarding.   Musculoskeletal:   Bandage over incision site.    Neurological: She is alert.    Disoriented to time. Mild visual hallucination but easily redirectable   Skin: Skin is warm and dry.   Psychiatric: She has a normal mood and affect. Her behavior is normal.   Vitals reviewed.      Significant Labs: All pertinent labs within the past 24 hours have been reviewed.    Significant Imaging: I have reviewed all pertinent imaging results/findings within the past 24 hours.  I have reviewed and interpreted all pertinent imaging results/findings within the past 24 hours.

## 2017-12-16 NOTE — ASSESSMENT & PLAN NOTE
As expected post surgery, in setting of anemia of chronic disease. No physical or clinical signs of active bleeding. Repeat CBC with persistent anemia that is not improving. I believe she is symptomatic from this and myself and family agree with transfusion of one unit to improve patient's quality of life. Consent obtained. Transfuse one unit today.

## 2017-12-16 NOTE — PLAN OF CARE
12/16/17 1529   Final Note   Assessment Type Final Discharge Note   Discharge Disposition [facility]   What phone number can be called within the next 1-3 days to see how you are doing after discharge? ([phone])   Hospital Follow Up  Appt(s) scheduled? Yes   Discharge plans and expectations educations in teach back method with documentation complete? Yes   Right Care Referral Info   Post Acute Recommendation Other   Referral Type Home with [facility]   Facility Name [facility]   1000:  [facility]ified [facility].  Unable to reach family to notify of DC.

## 2017-12-16 NOTE — NURSING
Attempted to contact Patient's daughter Ms. Lopez and granddaughter Marleny to inform them of discharge order and arrange transportation. Not able to speak with any one, and not able to leave   Voicemail.  made aware and arrange to take turn calling.

## 2017-12-16 NOTE — PROGRESS NOTES
[facility] Hospital Medicine  Progress Note    Patient Name: Rebecca Owen  MRN: [id number]  Patient Class: IP- Inpatient   Admission Date: 12/11/2017  Length of Stay: 5 days  Attending Physician: Odette Blount MD  Primary Care Provider: Asa Weinstein MD        Subjective:     Principal Problem:Closed fracture of right hip    HPI:  Mrs. Owen is a 61 yo F who presents to the ED with a CC or R hip pain. The patient notes that her daughter was having a seizure- she tried catching her and landed on her R hip. She presents to the ED and is found to have a R hip fracture. The patient has a known history of COPD and is on home 02.  She does not have any other complaints. No worsening SOB. No CP.  Ortho has been consulted and may take the patient to surgery later.     Hospital Course:  The patient was admitted to the hospital for evaluation and treatment of a traumatic R hip fracture. Imaging revealed acute comminuted intratrochanteric fracture of right hip. Ortho consulted. Patient is DNR and currently under hospice care, however, intervention will offer benefit, comfort and improved quality of life as she is not actively dying. Underwent IM nailing of right hip on 12/12. No complications or significant blood loss. Remained stable respiratory wise post surgery. Tolerated diet, DVT prophylaxis and physical therapy. Had one episode of hyperactive delirium overnight 12/13-12/14. This eventually resolved and was placed on CPAP for more oxygen support in the meantime.     Interval History: not as agitated but still confused. With anemia that is symptomatic. Family in agreement with transfusion.     Review of Systems   Constitutional: Negative for appetite change, chills, diaphoresis and fever.   Eyes: Negative for discharge.   Respiratory: Negative for apnea, cough, choking, chest tightness, shortness of breath, wheezing and stridor.    Cardiovascular: Negative for chest pain, palpitations  and leg swelling.   Gastrointestinal: Negative for abdominal pain, constipation, diarrhea, nausea and vomiting.   Genitourinary: Negative for difficulty urinating.   Musculoskeletal: Positive for arthralgias.   Psychiatric/Behavioral: Positive for confusion and hallucinations. Negative for agitation and behavioral problems.     Objective:     Vital Signs (Most Recent):  Temp: 98.9 °F (37.2 °C) (12/16/17 0455)  Pulse: 110 (12/16/17 0455)  Resp: (!) 21 (12/16/17 0455)  BP: (!) 140/90 (12/16/17 0455)  SpO2: (!) 94 % (12/16/17 0455) Vital Signs (24h Range):  Temp:  [97.5 °F (36.4 °C)-99.7 °F (37.6 °C)] 98.9 °F (37.2 °C)  Pulse:  [] 110  Resp:  [16-21] 21  SpO2:  [93 %-98 %] 94 %  BP: (110-151)/(71-94) 140/90     Weight: 44.9 kg (98 lb 15.8 oz)  Body mass index is 19.33 kg/m².    Intake/Output Summary (Last 24 hours) at 12/16/17 0701  Last data filed at 12/16/17 0600   Gross per 24 hour   Intake             1202 ml   Output                0 ml   Net             1202 ml      Physical Exam   Constitutional: She appears well-developed and well-nourished.   HENT:   Head: Normocephalic and atraumatic.   Cardiovascular: Normal rate.  Exam reveals no friction rub.    Pulmonary/Chest: Effort normal and breath sounds normal. No respiratory distress. She has no wheezes. She has no rales. She exhibits no tenderness.   Abdominal: Soft. Bowel sounds are normal. There is no tenderness. There is no rebound and no guarding.   Musculoskeletal:   Bandage over incision site.    Neurological: She is alert.   Disoriented to time. Mild visual hallucination but easily redirectable   Skin: Skin is warm and dry.   Psychiatric: She has a normal mood and affect. Her behavior is normal.   Vitals reviewed.      Significant Labs: All pertinent labs within the past 24 hours have been reviewed.    Significant Imaging: I have reviewed all pertinent imaging results/findings within the past 24 hours.  I have reviewed and interpreted all pertinent  imaging results/findings within the past 24 hours.    Assessment/Plan:      * Closed fracture of right hip    Traumatic fracture. Underwent IM nailing without complications on 12/12. Pain well managed and stable from respiratory standpoint. DVT proph and PT. Appreciated further ortho recs.     Patient will not tolerate intensive PT either at a SNF or rehab facility given advanced COPD. Gets hypoxic very easily and I believe this would only cause more harm. Patient and family agree that conservative therapy (transfers and ambulation as tolerated) while in hospice is preferred, especially when patient wishes to continue smoking. Will not pursue aggressive therapy, but rather as tolerated.            Acute blood loss anemia    As expected post surgery, in setting of anemia of chronic disease. No physical or clinical signs of active bleeding. Repeat CBC with persistent anemia that is not improving. I believe she is symptomatic from this and myself and family agree with transfusion of one unit to improve patient's quality of life. Consent obtained. Transfuse one unit today.           Chronic respiratory failure with hypoxia    On Home 02. Still smokes.           DJD (degenerative joint disease)    No acute issues.           Anxiety    Resume home meds.           Borderline hypertension    Well controlled on BP meds.           Hypokalemia    Minimal. Will follow           Mild protein malnutrition    Will discuss with patient .          Bipolar 1 disorder    Resume home meds. Had hallucinations overnight. Better now but still present. Easily redirectable. Now next to window. Delirium prevention protocol.           Tobacco abuse    Continues to smoke. Discussed with patient. Not motivated to quit           GERD (gastroesophageal reflux disease)    Resume home meds.             VTE Risk Mitigation         Ordered     enoxaparin injection 40 mg  Daily     Route:  Subcutaneous        12/14/17 0956     Medium Risk of VTE  Once       12/11/17 1625     Place NOEMY hose  Until discontinued      12/11/17 1625        Transfuse one unit today. Tentative discharge with hospice tomorrow      Odette Harris MD  Department of Hospital Medicine   [facility]

## 2017-12-16 NOTE — NURSING
Patient's granddaughter Marleny here to   patient. Discharge. Explained discharge instruction to  Both patient and granddaughter with statement of understanding. Also instructed granddaughter to  Follow up orthopedics appointment. Patient was safely transferred into w/c and into granddaughter's vehicle where they both left the hospital safely.

## 2017-12-16 NOTE — PLAN OF CARE
Problem: Fall Risk (Adult)  Goal: Absence of Falls  Patient will demonstrate the desired outcomes by discharge/transition of care.   Outcome: Ongoing (interventions implemented as appropriate)   12/16/17 0430   Fall Risk (Adult)   Absence of Falls making progress toward outcome   Located close to nursing station with bed alarm on, instructed to call for assistance when needed.    Problem: Patient Care Overview  Goal: Plan of Care Review  Outcome: Ongoing (interventions implemented as appropriate)   12/16/17 0430   Coping/Psychosocial   Plan Of Care Reviewed With patient   Rested quietly during night, dsg x3 to right hip area. Tolerated diet and drinking po fluids.    Problem: Pain, Acute (Adult)  Goal: Acceptable Pain Control/Comfort Level  Patient will demonstrate the desired outcomes by discharge/transition of care.   Outcome: Ongoing (interventions implemented as appropriate)   12/16/17 0430   Pain, Acute (Adult)   Acceptable Pain Control/Comfort Level making progress toward outcome   Received scheduled pain meds for level 8/10.

## 2017-12-16 NOTE — NURSING
Patient's granddaughter arrived to  Visit with patient and informed that patient had discharge order and [facility] was informed of patient discharge and family is instructed to call [facility] to arrange time to  Visit with  Patient. Patient is currently resting in bed with eyes open resp. Even non-labored. No acute distress noted. Patient A&A x2 with confusion, able to make needs known denies pain and discomfort at present time. Surgical hip intact, dry and staples intact no s/s of infection noted. Patient able to transfer to  Chair, bed side commode  With assist. Safety maintained, call light in reach will monitor.

## 2017-12-16 NOTE — PLAN OF CARE
[facility]     Department of Hospital Medicine     [address] [place] [address]     [phone] after hours  [phone] fax                                   HOSPICE  ORDERS     Patient Name: Rebecca Owen  YOB: [birth year]    Admit to Hospice:  Home Service      Diagnoses:  Active Hospital Problems    Diagnosis  POA    *Closed fracture of right hip [S72.001A]  Yes     Priority: 1 - High    Acute blood loss anemia [D62]  No    Chronic respiratory failure with hypoxia [J96.11]  Yes    Anxiety [F41.9]  Yes    Hypokalemia [E87.6]  Yes    Borderline hypertension [R03.0]  Yes    DJD (degenerative joint disease) [M19.90]  Yes    Mild protein malnutrition [E44.1]  Yes    Bipolar 1 disorder [F31.9]  Yes    Tobacco abuse [Z72.0]  Yes     Chronic    GERD (gastroesophageal reflux disease) [K21.9]  Yes     Chronic      Resolved Hospital Problems    Diagnosis Date Resolved POA    Current every day smoker [F17.200] 06/10/2015 Yes     Chronic    Leukocytosis [D72.829] 06/10/2015 Yes    Essential hypertension [I10] 12/11/2017 Yes     Chronic       Hospice Qualifying Diagnoses: advanced COPD       Patient has a life expectancy < 6 months due to these conditions.    Vital Signs: Routine per Hospice Protocol.    Allergies:  Review of patient's allergies indicates:   Allergen Reactions    Avelox [moxifloxacin] Rash       Diet: regular diet     Activities: activity as tolerated, with assistance    Nursing: Per Hospice Routine    Future Orders:  Hospice Medical Director may dictate new orders for comfortable care measures & sign death certificate.    Medications:         Comfort Care Medications Only     Rebecca Owen   Home Medication Instructions JORGE:[id number]    Printed on:12/16/17 0803   Medication Information                      albuterol 90 mcg/actuation inhaler  Inhale 2 puffs into the lungs every 6 (six)  hours as needed for Wheezing.             albuterol-ipratropium 2.5mg-0.5mg/3mL (DUO-NEB) 0.5 mg-3 mg(2.5 mg base)/3 mL nebulizer solution  Take 3 mLs by nebulization every 6 (six) hours as needed for Wheezing.             aluminum-magnesium hydroxide-simethicone (MAALOX) 200-200-20 mg/5 mL Susp  Take 30 mLs by mouth every 6 (six) hours as needed. For indigestion             aripiprazole (ABILIFY) 30 MG Tab  Take 15 mg by mouth nightly.              benzonatate (TESSALON) 100 MG capsule  Take 100 mg by mouth 3 (three) times daily as needed for Cough.             bisacodyl (FLEET) 10 mg/30 mL Enem  Place 30 mLs (10 mg total) rectally daily as needed. As needed for constipation             cyclobenzaprine (FLEXERIL) 10 MG tablet  Take 10 mg by mouth 3 (three) times daily as needed for Muscle spasms.             fentaNYL (DURAGESIC) 25 mcg/hr  Place 1 patch onto the skin every 72 hours.             fluoxetine (PROZAC) 40 MG capsule  Take 20 mg by mouth once daily.              fluticasone-salmeterol 500-50 mcg/dose (ADVAIR DISKUS) 500-50 mcg/dose DsDv diskus inhaler  Inhale 1 puff into the lungs 2 (two) times daily. Rinse mouth after each use.             guaiFENesin (MUCINEX) 600 mg 12 hr tablet  Take 1,200 mg by mouth as needed 2 (two) times daily for congestion.             lorazepam (ATIVAN) 1 MG tablet  Take 1 mg by mouth 2 (two) times daily as needed.             meloxicam (MOBIC) 15 MG tablet  Take 15 mg by mouth once daily.             omeprazole (PRILOSEC) 20 MG capsule  Take 20 mg by mouth once daily.             oxybutynin (DITROPAN) 5 MG Tab  Take 5 mg by mouth 3 (three) times daily.             oxyCODONE (OXYCONTIN) 30 mg TR12 12 hr tablet  Take 30 mg by mouth every 12 (twelve) hours.             OXYGEN-AIR DELIVERY SYSTEMS MISC  [place] [facility] [place]; Combo Route) route. Uses O2 via N/C  At 2 liters at home when neededd             polyethylene glycol (GLYCOLAX) 17 gram PwPk  Take by mouth daily as needed for  constipation.             Aspirin  Take one tab (81 mg) by mouth daily for 30 days           promethazine (PHENERGAN) 25 MG tablet  Take 25 mg by mouth every 4 (four) hours as needed for nausea and/or vomiting.             senna-docusate 8.6-50 mg (SENNA WITH DOCUSATE SODIUM) 8.6-50 mg per tablet  Take 1 tablet by mouth once daily.             temazepam (RESTORIL) 30 mg capsule  Take 30 mg by mouth nightly as needed for Insomnia.             tiotropium (SPIRIVA WITH HANDIHALER) 18 mcg inhalation capsule  Inhale 18 mcg into the lungs once daily.             trazodone (DESYREL) 300 MG tablet  Take 400 mg by mouth every evening.                   Electronically signed  _________________________________  Odette Harris MD  12/16/2017

## 2017-12-18 NOTE — PT/OT/SLP DISCHARGE
Physical Therapy Discharge Summary    Name: Rebecca Owen  MRN: [id number]   Principal Problem: Closed fracture of right hip     Patient Discharged from acute Physical Therapy on 17.  Please refer to prior PT noted date on 12/15/17 for functional status.     Assessment:     Goals partially met. Patient appropriate for care in another setting.    Objective:     GOALS:    Physical Therapy Goals        Problem: Physical Therapy Goal    Goal Priority Disciplines Outcome Goal Variances Interventions   Physical Therapy Goal     PT/OT, PT Unable to achieve outcome(s) by discharge     Description:  Goals to be met by: 17    Patient will increase functional independence with mobility by performin. Supine to sit with supervision  2. Sit to stand transfer with supervision  3. Bed to chair transfer with supervision using Rolling Walker  4. Gait x50 feet with supervision using Rolling Walker  5. Lower extremity exercise program x20 reps per handout, with supervision                      Reasons for Discontinuation of Therapy Services  Transfer to alternate level of care.      Plan:     Patient Discharged to: Home with Home Health Service.    Nissa Gregory, PT  2017

## 2017-12-19 LAB
BACTERIA BLD CULT: NORMAL
BACTERIA BLD CULT: NORMAL

## (undated) DEVICE — HOLDER TUBE

## (undated) DEVICE — SUPPORT ULNA NERVE PROTECTOR

## (undated) DEVICE — SUT CTD VICRYL UND BR CP-1

## (undated) DEVICE — SUT CTD VICRYL 0 UND BR CPX

## (undated) DEVICE — GLOVE SURG BIOGEL LATEX SZ 7.5

## (undated) DEVICE — APPLICATOR CHLORAPREP ORN 26ML

## (undated) DEVICE — Device

## (undated) DEVICE — DRAPE STERI

## (undated) DEVICE — STAPLER SKIN PROXIMATE WIDE

## (undated) DEVICE — BIT DRILL QC 3FLUTED 4.2X145 S

## (undated) DEVICE — SEE MEDLINE ITEM 146345

## (undated) DEVICE — ELECTRODE REM PLYHSV RETURN 9

## (undated) DEVICE — WIRE GUIDE 3.2MM 400MM
Type: IMPLANTABLE DEVICE | Site: HIP | Status: NON-FUNCTIONAL
Removed: 2017-12-12

## (undated) DEVICE — DRESSING XEROFORM FOIL PK 1X8

## (undated) DEVICE — TRAY MAJOR ORTHO 2/CS

## (undated) DEVICE — BLANKET UPPER BODY 78.7X29.9IN